# Patient Record
Sex: MALE | Race: WHITE | Employment: OTHER | URBAN - NONMETROPOLITAN AREA
[De-identification: names, ages, dates, MRNs, and addresses within clinical notes are randomized per-mention and may not be internally consistent; named-entity substitution may affect disease eponyms.]

---

## 2019-05-07 ENCOUNTER — HOSPITAL ENCOUNTER (OUTPATIENT)
Dept: NON INVASIVE DIAGNOSTICS | Age: 84
Discharge: HOME | End: 2019-05-07
Payer: MEDICARE

## 2019-05-07 ENCOUNTER — HOSPITAL ENCOUNTER (OUTPATIENT)
Dept: INTERVENTIONAL RADIOLOGY/VASCULAR | Age: 84
Discharge: HOME | End: 2019-05-07
Payer: MEDICARE

## 2019-05-07 DIAGNOSIS — Z13.6 ENCOUNTER FOR SCREENING FOR VASCULAR DISEASE: ICD-10-CM

## 2019-05-07 LAB
LV EF: 33 %
LVEF MODALITY: NORMAL

## 2019-05-07 PROCEDURE — 9900000021 US VASCULAR SCREENING

## 2019-05-07 PROCEDURE — 93306 TTE W/DOPPLER COMPLETE: CPT

## 2019-05-14 ENCOUNTER — TELEPHONE (OUTPATIENT)
Dept: CARDIOLOGY CLINIC | Age: 84
End: 2019-05-14

## 2019-05-15 ENCOUNTER — OFFICE VISIT (OUTPATIENT)
Dept: CARDIOTHORACIC SURGERY | Age: 84
End: 2019-05-15
Payer: MEDICARE

## 2019-05-15 ENCOUNTER — OFFICE VISIT (OUTPATIENT)
Dept: CARDIOLOGY CLINIC | Age: 84
End: 2019-05-15
Payer: MEDICARE

## 2019-05-15 VITALS
DIASTOLIC BLOOD PRESSURE: 66 MMHG | HEIGHT: 68 IN | WEIGHT: 192.6 LBS | BODY MASS INDEX: 29.19 KG/M2 | HEART RATE: 58 BPM | SYSTOLIC BLOOD PRESSURE: 114 MMHG

## 2019-05-15 VITALS — DIASTOLIC BLOOD PRESSURE: 84 MMHG | WEIGHT: 192 LBS | HEART RATE: 62 BPM | SYSTOLIC BLOOD PRESSURE: 120 MMHG

## 2019-05-15 DIAGNOSIS — R06.02 SHORTNESS OF BREATH: Primary | ICD-10-CM

## 2019-05-15 DIAGNOSIS — I35.0 SEVERE AORTIC STENOSIS: ICD-10-CM

## 2019-05-15 PROCEDURE — 1123F ACP DISCUSS/DSCN MKR DOCD: CPT | Performed by: PHYSICIAN ASSISTANT

## 2019-05-15 PROCEDURE — 99215 OFFICE O/P EST HI 40 MIN: CPT | Performed by: THORACIC SURGERY (CARDIOTHORACIC VASCULAR SURGERY)

## 2019-05-15 PROCEDURE — APPSS60 APP SPLIT SHARED TIME 46-60 MINUTES: Performed by: PHYSICIAN ASSISTANT

## 2019-05-15 PROCEDURE — G8427 DOCREV CUR MEDS BY ELIG CLIN: HCPCS | Performed by: INTERNAL MEDICINE

## 2019-05-15 PROCEDURE — 1036F TOBACCO NON-USER: CPT | Performed by: INTERNAL MEDICINE

## 2019-05-15 PROCEDURE — G8419 CALC BMI OUT NRM PARAM NOF/U: HCPCS | Performed by: PHYSICIAN ASSISTANT

## 2019-05-15 PROCEDURE — 1123F ACP DISCUSS/DSCN MKR DOCD: CPT | Performed by: INTERNAL MEDICINE

## 2019-05-15 PROCEDURE — G8427 DOCREV CUR MEDS BY ELIG CLIN: HCPCS | Performed by: PHYSICIAN ASSISTANT

## 2019-05-15 PROCEDURE — 93000 ELECTROCARDIOGRAM COMPLETE: CPT | Performed by: INTERNAL MEDICINE

## 2019-05-15 PROCEDURE — 4040F PNEUMOC VAC/ADMIN/RCVD: CPT | Performed by: PHYSICIAN ASSISTANT

## 2019-05-15 PROCEDURE — 99205 OFFICE O/P NEW HI 60 MIN: CPT | Performed by: INTERNAL MEDICINE

## 2019-05-15 PROCEDURE — 4040F PNEUMOC VAC/ADMIN/RCVD: CPT | Performed by: INTERNAL MEDICINE

## 2019-05-15 PROCEDURE — 99204 OFFICE O/P NEW MOD 45 MIN: CPT | Performed by: PHYSICIAN ASSISTANT

## 2019-05-15 PROCEDURE — 1036F TOBACCO NON-USER: CPT | Performed by: PHYSICIAN ASSISTANT

## 2019-05-15 PROCEDURE — G8419 CALC BMI OUT NRM PARAM NOF/U: HCPCS | Performed by: INTERNAL MEDICINE

## 2019-05-15 RX ORDER — ATENOLOL 50 MG/1
50 TABLET ORAL DAILY
Status: ON HOLD | COMMUNITY
End: 2019-07-19 | Stop reason: HOSPADM

## 2019-05-15 RX ORDER — DILTIAZEM HYDROCHLORIDE 240 MG/1
240 CAPSULE, COATED, EXTENDED RELEASE ORAL DAILY
COMMUNITY

## 2019-05-15 RX ORDER — LOVASTATIN 40 MG/1
40 TABLET ORAL NIGHTLY
Status: ON HOLD | COMMUNITY
End: 2019-07-19 | Stop reason: HOSPADM

## 2019-05-15 RX ORDER — CLOPIDOGREL BISULFATE 75 MG/1
75 TABLET ORAL DAILY
COMMUNITY

## 2019-05-15 RX ORDER — GLIMEPIRIDE 4 MG/1
4 TABLET ORAL
Status: ON HOLD | COMMUNITY
End: 2019-07-19 | Stop reason: HOSPADM

## 2019-05-15 RX ORDER — PANTOPRAZOLE SODIUM 40 MG/1
40 GRANULE, DELAYED RELEASE ORAL
COMMUNITY

## 2019-05-15 RX ORDER — FOSINOPRIL SODIUM 20 MG/1
20 TABLET ORAL DAILY
Status: ON HOLD | COMMUNITY
End: 2019-07-19 | Stop reason: HOSPADM

## 2019-05-15 NOTE — PROGRESS NOTES
[facility] CARDIOLOGY  [address] [address]  Dept: [phone]  Dept Fax: [phone]  Loc: [phone]    Visit Date: 5/15/2019    Mr. Ángel Álvarez is a 82 y.o. male  who presented for:  Chief Complaint   Patient presents with    New Patient    Check-Up       HPI:   HPI   83 yo M with hx of PAD, right CEA, 20 year smoking history who presents for evaluation of severe aortic stenosis. He has had a progressive decline over the last 6 months. He was told he had severe AS for over 5 years but thought he did not need to do anything about it. He recently had echo showing EF 30-35%, MICHELLE 0.3 cm2, mean gradient 62. He is tired and cannot do his ADLS without having to stop and \"take a breather. \"  He notes some mild swelling of his LE. He does have lightheadedness but ignores it on a daily basis. No syncope. No hx of MI. No active malignancies. He has poor dentition and states he needs to see a dentist.  ECG shows NSR, TWI anterolaterally/inferolaterally.           Current Outpatient Medications:     atenolol (TENORMIN) 50 MG tablet, Take 50 mg by mouth daily, Disp: , Rfl:     fosinopril (MONOPRIL) 20 MG tablet, Take 20 mg by mouth daily, Disp: , Rfl:     glimepiride (AMARYL) 4 MG tablet, Take 4 mg by mouth every morning (before breakfast), Disp: , Rfl:     diltiazem (CARDIZEM CD) 240 MG extended release capsule, Take 240 mg by mouth daily, Disp: , Rfl:     metFORMIN (GLUCOPHAGE) 500 MG tablet, Take 500 mg by mouth 4 times daily, Disp: , Rfl:     lovastatin (MEVACOR) 40 MG tablet, Take 40 mg by mouth nightly, Disp: , Rfl:     clopidogrel (PLAVIX) 75 MG tablet, Take 75 mg by mouth daily, Disp: , Rfl:     pantoprazole sodium (PROTONIX) 40 MG PACK packet, Take 40 mg by mouth every morning (before breakfast), Disp: , Rfl:     Past Medical History  Ken Salcedo  has a past medical history of Abnormality of aortic valve, Cancer ([facility] [address] and Carotid artery narrowing. Social History  Polly Samuel  reports that he quit smoking about 50 years ago. He started smoking about 70 years ago. He has never used smokeless tobacco. He reports that he drank alcohol. He reports that he does not use drugs. Family History  Polly Samuel family history includes Diabetes in his mother; Heart Attack in his brother; Hypertension in his mother. There is no family history of bicuspid aortic valve, aneurysms, heart transplant, pacemakers, defibrillators, or sudden cardiac death. Past Surgical History   History reviewed. No pertinent surgical history. Review of Systems   Constitutional: Negative for chills and fever  HENT: Negative for congestion, sinus pressure, sneezing and sore throat. Eyes: Negative for pain, discharge, redness and itching. Respiratory: Negative for apnea, cough  Gastrointestinal: Negative for blood in stool, constipation, diarrhea   Endocrine: Negative for cold intolerance, heat intolerance, polydipsia. Genitourinary: Negative for dysuria, enuresis, flank pain and hematuria. Musculoskeletal: Negative for arthralgias, joint swelling and neck pain. Neurological: Negative for numbness and headaches. Psychiatric/Behavioral: Negative for agitation, confusion, decreased concentration and dysphoric mood. Objective:     /84   Pulse 62   Wt 192 lb (87.1 kg)     Wt Readings from Last 3 Encounters:   05/15/19 192 lb (87.1 kg)     BP Readings from Last 3 Encounters:   05/15/19 120/84       Nursing note and vitals reviewed. Physical Exam   Constitutional: Oriented to person, place, and time. Appears well-developed but frail. HENT:   Head: Normocephalic and atraumatic. Eyes: EOM are normal. Pupils are equal, round, and reactive to light. Neck: Normal range of motion. Neck supple. No JVD present. Oral:  Missing teeth, poor dentition  Cardiovascular: Normal rate, regular rhythm, normal heart sounds and intact distal pulses.     Soft, but high pitched, late peaking murmur, soft radiation to both carotids, S2 not audible  Pulmonary/Chest: Effort normal and breath sounds normal. No respiratory distress. No wheezes. No rales. Abdominal: Soft. Bowel sounds are normal. No distension. There is no tenderness. Musculoskeletal: Normal range of motion. 1+ bilateral LE edema. Neurological: Alert and oriented to person, place, and time. No cranial nerve deficit. Coordination normal.   Skin: Skin is warm and dry. Psychiatric: Normal mood and affect. No results found for: CKTOTAL, CKMB, CKMBINDEX    No results found for: WBC, RBC, HGB, HCT, MCV, MCH, MCHC, RDW, PLT, MPV    No results found for: NA, K, CL, CO2, BUN, LABALBU, CREATININE, CALCIUM, GFRAA, LABGLOM, GLUCOSE    No results found for: ALKPHOS, ALT, AST, PROT, BILITOT, BILIDIR, IBILI, LABALBU    No results found for: MG    No results found for: INR, PROTIME      No results found for: LABA1C    No results found for: TRIG, HDL, LDLCALC, LDLDIRECT, LABVLDL    No results found for: TSH      Testing Reviewed:      I have individually reviewed the cardiac test below:    ECHO:   Results for orders placed during the hospital encounter of 05/07/19   Echocardiogram 2D/ M-Mode/ Colorflow/ Do    Narrative Transthoracic Echocardiography Report (TTE)     Demographics      Patient Name     Suri Garcia Gender                 Male      MR #             [id number]    Race                                                       Ethnicity      Account #        [de-identified]    Room Number      Accession Number [id number]    Date of Study          05/07/2019      Date of Birth    1935   Referring Physician    Stephania Kurtz MD      Age              82 year(s)   Sebastien Kemp [facility]                                    Interpreting Physician Caswell Cooks MD     Procedure    Type of Study      TTE procedure:ECHOCARDIOGRAM COMPLETE 2D W DOPPLER W COLOR.      Procedure Date  Date: 05/07/2019 Start: 03:20 PM    Study Location: Echo Lab  Technical Quality: Adequate visualization    Indications: Aortic stenosis. Additional Medical History:Hypertension, PVD, Former smoker, Family history  of heart disease    Patient Status: Routine    Height: 68 inches Weight: 197 pounds BSA: 2.03 m^2 BMI: 29.95 kg/m^2    BP: 106/58 mmHg     Conclusions      Summary   Ejection fraction was estimated at 30-35%   Left atrial size was severely dilated   The right ventricular size was normal with normal systolic function and   wall thickness. The aortic valve was heavily calcified, restricted, and demonstrated   reduced mobility. Transaortic velocity was 4.8 m/s, mean gradient 62 mmHg, estimated MICHELLE 0.3   cm2. There is critical aortic stenosis. There was trace evidence of aortic regurgitation. There was mild mitral regurgitation. There was trace tricuspid regurgitation. Assuming RAP = 5 mmHg, the estimated RVSP = 38 mmHg. IVC size is within normal limits with normal respiratory phasic changes. If clinically indicated, consider referral to the [address] [place] [address]   ([phone]). Signature      ----------------------------------------------------------------   Electronically signed by Tyshawn Richardson MD (Interpreting   physician) on 05/07/2019 at 04:56 PM   ----------------------------------------------------------------      Findings      Mitral Valve   The mitral valve structure demonstrated posterior leaflet calcification. DOPPLER: The transmitral velocity was within the normal range with no   evidence for mitral stenosis. There was mild mitral regurgitation. Aortic Valve   The aortic valve was heavily calcified, restricted, and demonstrated   reduced mobility. DOPPLER: Transaortic velocity was 4.8 m/s, mean gradient   62 mmHg, estimated MICHELLE 0.3 cm2. There is critical aortic stenosis. There   was trace evidence of aortic regurgitation.       Tricuspid Valve   The tricuspid valve structure was normal with normal leaflet separation. DOPPLER: There was no evidence of tricuspid stenosis. There was trace   tricuspid regurgitation. Assuming RAP = 5 mmHg, the estimated RVSP = 38   mmHg. Pulmonic Valve   The pulmonic valve leaflets were not well seen. DOPPLER: The transpulmonic   velocity was within the normal range with trace regurgitation. Left Atrium   Left atrial size was severely dilated. Left Ventricle   Normal left ventricular size and severely reduced systolic function. There was severe global hypokinesis. Wall thickness was within normal limits. Ejection fraction was estimated at 30-35%      Right Atrium   Right atrial size was normal.      Right Ventricle   The right ventricular size was normal with normal systolic function and   wall thickness. Pericardial Effusion   The pericardium was normal in appearance with no evidence of a pericardial   effusion. Pleural Effusion   No evidence of pleural effusion. Aorta / Great Vessels   IVC size is within normal limits with normal respiratory phasic changes.      M-Mode/2D Measurements & Calculations      LV Diastolic   LV Systolic Dimension:    AV Cusp Separation: 1.1 cmLA   Dimension: 5.3 4.6 cm                    Dimension: 3.9 cmAO Root   cm             LV Volume Diastolic: 471  Dimension: 3.1 cmLA Area: 25.2   LV FS:13.2 %   ml                        cm^2   LV PW          LV Volume Systolic: 43.4   Diastolic: 0.9 ml   cm             LV EDV/LV EDV Index: 150   Septum         ml/74 m^2LV ESV/LV ESV    RV Diastolic Dimension: 3 cm   Diastolic: 0.9 Index: 15.2 ml/48 m^2   cm             EF Calculated: 35 %       LA/Aorta: 1.26                     LV Length: 9.1 cm         LA volume/Index: 86 ml /42m^2      LV Area        LVOT: 1.8 cm   Diastolic:   14.2 cm^2   LV Area   Systolic: 10.8   cm^2     Doppler Measurements & Calculations      MV Peak E-Wave:    AV Peak Velocity: 453    LVOT Peak Velocity: 66.2 cm/s   104 cm/s myself, family, patient, and structural heart coordinators. The family and patient were explained the risks/benefits/alternatives of the procedure, how the procedure works, the work-up, post-procedural care, and all of the possible complications of the procedure, including, but not limited to vascular injury, debilitating stroke, need for permanent pacemaker, and death. The patient/family would like to pursue TAVR at our facility and we will work towards this goal.         The patient and family understand that should there be any findings or issues that arise during the evaluation that would preclude TAVR, that this will need to be evaluated prior to proceeding with a percutaneous approach. Further, a unified heart team approach will be performed prior to proceeding with TAVR which includes the patient's primary care givers, cardiologist, and the entire structural heart team (interventionalist, CT surgeons, structural heart NP).    The patient and family appeared to understand everything, all of their questions were answered to their satisfaction and they are agreeable to proceed with further evaluation for TAVR.       Thank you for allowing us to participate in the care of this patient.   Please do not hesitate to call us with questions.         Disposition:  TAVR work-up    Electronically signed by Alicia Chen MD   5/15/2019 at 12:45 PM

## 2019-05-15 NOTE — PROGRESS NOTES
CT surgery New Patient Office Appointment    5/15/2019 2:15 PM    Patient's Name/Date of Birth: Qiana Middleton / 1935 (82 y.o.)    PCP: Gilma Santiago MD    Date: May 15, 2019     HPI  We had the pleasure of seeing Qiana Middleton in the office today, as you know this is a very pleasant 82y.o. year old male with a history of severe aortic stenosis. He smoked 20 yrs and quit around age 30. He has hx of Rt Fem-Pop utilizing left greater saphenous vein in 2010 and Rt CEA in 2005. He has noticed REESE and chest pressure with one flight of stairs only after eating a large meal for the past few weeks. He denies any hx of MI or CHF. He denies any hx of syncope. He has hx of heart murmur for many years. He underwent an Echo with the results listed below. He states that he was told he needs his aortic valve fixed and after seeing what his brother went thru with open heart surgery, he refuses to have his chest \"cracked open. \"    Echo 5/7/19  Summary   Ejection fraction was estimated at 30-35%   Left atrial size was severely dilated   The right ventricular size was normal with normal systolic function and   wall thickness.   The aortic valve was heavily calcified, restricted, and demonstrated   reduced mobility.   Transaortic velocity was 4.8 m/s, mean gradient 62 mmHg, estimated MICHELLE 0.3   cm2. There is critical aortic stenosis.  Serenity Factor was trace evidence of aortic regurgitation.   There was mild mitral regurgitation.   There was trace tricuspid regurgitation.   Assuming RAP = 5 mmHg, the estimated RVSP = 38 mmHg.   IVC size is within normal limits with normal respiratory phasic changes. PastMedical History:  Tad Dougherty  has a past medical history of Abnormality of aortic valve, Cancer ([address] and Carotid artery narrowing. Past Surgical History:  2005 Rt CEA  2010 Rt Fem-Pop utilizing left greater saphenous vein     Allergies: The patient has No Known Allergies.     Medications:    Current Outpatient Medications:    atenolol (TENORMIN) 50 MG tablet, Take 50 mg by mouth daily, Disp: , Rfl:     fosinopril (MONOPRIL) 20 MG tablet, Take 20 mg by mouth daily, Disp: , Rfl:     glimepiride (AMARYL) 4 MG tablet, Take 4 mg by mouth every morning (before breakfast), Disp: , Rfl:     diltiazem (CARDIZEM CD) 240 MG extended release capsule, Take 240 mg by mouth daily, Disp: , Rfl:     metFORMIN (GLUCOPHAGE) 500 MG tablet, Take 500 mg by mouth 4 times daily, Disp: , Rfl:     lovastatin (MEVACOR) 40 MG tablet, Take 40 mg by mouth nightly, Disp: , Rfl:     clopidogrel (PLAVIX) 75 MG tablet, Take 75 mg by mouth daily, Disp: , Rfl:     pantoprazole sodium (PROTONIX) 40 MG PACK packet, Take 40 mg by mouth every morning (before breakfast), Disp: , Rfl:     Family History: This patient's family history includes Diabetes in his mother; Heart Attack in his brother; Hypertension in his mother. Social History:  Tessie Spencer  reports that he quit smoking about 50 years ago. He started smoking about 70 years ago. He has never used smokeless tobacco. He reports that he drank alcohol. He reports that he does not use drugs. Imaging:  ECHO: see HPI     Vitals:    05/15/19 1407   BP: 114/66   Site: Left Upper Arm   Position: Sitting   Cuff Size: Medium Adult   Pulse: 58   Weight: 192 lb 9.6 oz (87.4 kg)   Height: 5' 8\" (1.727 m)     ROS:   Constitutional: Negative for activity change, chills, fever and unexpected weight change. Positive for fatigue. Respiratory: Negative for apnea, shortness of breath, wheezing and stridor. Cardiovascular: Negative for chest pain, palpitations and leg swelling. Gastrointestinal: Negative for hematochezia, melana, constipation, and N/V/D. Musculoskeletal: Negative for myalgias  Skin: Negative for color change, rash and wound. Neurological: Negative for dizziness or syncope.     Physical Exam:   General Appearance: alert ,conversing, in no acute distress  Cardiovascular: normal rate, regular rhythm, Grade III/VI ADAMS LSB with no rubs, clicks, or gallops  Pulmonary/Chest: clear to auscultation bilaterally- no wheezes, rales or rhonchi, normal air movement, no respiratory distress  Abdomen:soft, non-tender, non-distended, normal bowel sounds, no bruits,   Extremities: no cyanosis or clubbing. Left leg swelling mild (present since his bypass in 2010). Skin: warm and dry, no rash or erythema  Head: normocephalic and atraumatic  Neck: supple and non-tender without mass, no thyromegaly, no JVD   Musculoskeletal: normal range of motion, no joint swelling, deformity or tenderness. Neurological: alert, oriented, normal speech, no focal findings or movement disorder noted. Capillary Refill: Brisk,< 3 seconds   Peripheral Pulses: +1 palpable, equal bilaterally     Assessment:   Severe Aortic Stenosis    Plan: 5/15/19  3 82year-old male with severe symptomatic aortic stenosis. Patient clearly is at high risk for surgical aortic valve replacement, for reasons of refusal of open heart surgery, age, frailty, and significant PVD. Recommend proceed with evaluation for TAVR. The plan of care was discussed in detail with Dr. Dena Zhu.

## 2019-05-20 ENCOUNTER — TELEPHONE (OUTPATIENT)
Dept: CARDIOLOGY CLINIC | Age: 84
End: 2019-05-20

## 2019-07-08 ENCOUNTER — TELEPHONE (OUTPATIENT)
Dept: CARDIOLOGY CLINIC | Age: 84
End: 2019-07-08

## 2019-07-08 ENCOUNTER — HOSPITAL ENCOUNTER (OUTPATIENT)
Dept: GENERAL RADIOLOGY | Age: 84
Discharge: HOME | DRG: 291 | End: 2019-07-08
Payer: MEDICARE

## 2019-07-08 ENCOUNTER — HOSPITAL ENCOUNTER (OUTPATIENT)
Age: 84
Discharge: HOME | DRG: 291 | End: 2019-07-08
Payer: MEDICARE

## 2019-07-08 DIAGNOSIS — J18.9 PNEUMONIA DUE TO INFECTIOUS ORGANISM, UNSPECIFIED LATERALITY, UNSPECIFIED PART OF LUNG: ICD-10-CM

## 2019-07-08 DIAGNOSIS — R05.9 COUGH: ICD-10-CM

## 2019-07-08 PROCEDURE — 71046 X-RAY EXAM CHEST 2 VIEWS: CPT

## 2019-07-08 NOTE — TELEPHONE ENCOUNTER
Denys Villavicencio from Dr. Rip Rosas office called to notify us that pt has been non-compliant and knows that Dr. Maddie Dumas and Dr. Natalee Huerta have been in contact in regards to pt in the past.     Denys Villavicnecio states pt is much worse now. They would like a nurse or Dr. Maddie Dumas to call their office back at [phone]    Could not reach office.

## 2019-07-09 NOTE — TELEPHONE ENCOUNTER
Talked to staff at Dr. Danny Barreto office and patient is more symptomatic with SOB and dizziness and she states patient wants to move forward with dental work. Appt made for patient to see Dr. Jones Tee on 7/10/2019 at 1030. She stated she will contact patient.

## 2019-07-10 ENCOUNTER — APPOINTMENT (OUTPATIENT)
Dept: GENERAL RADIOLOGY | Age: 84
DRG: 291 | End: 2019-07-10
Payer: MEDICARE

## 2019-07-10 ENCOUNTER — APPOINTMENT (OUTPATIENT)
Dept: CT IMAGING | Age: 84
DRG: 291 | End: 2019-07-10
Payer: MEDICARE

## 2019-07-10 ENCOUNTER — HOSPITAL ENCOUNTER (INPATIENT)
Age: 84
LOS: 9 days | Discharge: HOME | DRG: 291 | End: 2019-07-19
Attending: NURSE PRACTITIONER | Admitting: INTERNAL MEDICINE
Payer: MEDICARE

## 2019-07-10 DIAGNOSIS — N17.9 AKI (ACUTE KIDNEY INJURY) (HCC): Primary | ICD-10-CM

## 2019-07-10 DIAGNOSIS — A41.9 SEPTICEMIA (HCC): ICD-10-CM

## 2019-07-10 DIAGNOSIS — E86.0 DEHYDRATION: ICD-10-CM

## 2019-07-10 DIAGNOSIS — J18.9 PNEUMONIA DUE TO ORGANISM: ICD-10-CM

## 2019-07-10 LAB
ALBUMIN SERPL-MCNC: 3.3 G/DL (ref 3.5–5.1)
ALP BLD-CCNC: 95 U/L (ref 38–126)
ALT SERPL-CCNC: 102 U/L (ref 11–66)
ANION GAP SERPL CALCULATED.3IONS-SCNC: 20 MEQ/L (ref 8–16)
ANION GAP SERPL CALCULATED.3IONS-SCNC: 23 MEQ/L (ref 8–16)
AST SERPL-CCNC: 111 U/L (ref 5–40)
BACTERIA: ABNORMAL /HPF
BASOPHILS # BLD: 0.2 %
BASOPHILS ABSOLUTE: 0 THOU/MM3 (ref 0–0.1)
BILIRUB SERPL-MCNC: 1.1 MG/DL (ref 0.3–1.2)
BILIRUBIN DIRECT: 0.4 MG/DL (ref 0–0.3)
BILIRUBIN URINE: ABNORMAL
BLOOD, URINE: ABNORMAL
BUN BLDV-MCNC: 37 MG/DL (ref 7–22)
BUN BLDV-MCNC: 39 MG/DL (ref 7–22)
CALCIUM SERPL-MCNC: 8.8 MG/DL (ref 8.5–10.5)
CALCIUM SERPL-MCNC: 9.1 MG/DL (ref 8.5–10.5)
CASTS 2: ABNORMAL /LPF
CASTS UA: ABNORMAL /LPF
CHARACTER, URINE: ABNORMAL
CHLORIDE BLD-SCNC: 102 MEQ/L (ref 98–111)
CHLORIDE BLD-SCNC: 99 MEQ/L (ref 98–111)
CO2: 14 MEQ/L (ref 23–33)
CO2: 16 MEQ/L (ref 23–33)
COLOR: ABNORMAL
CREAT SERPL-MCNC: 1.6 MG/DL (ref 0.4–1.2)
CREAT SERPL-MCNC: 1.7 MG/DL (ref 0.4–1.2)
CRYSTALS, UA: ABNORMAL
EKG ATRIAL RATE: 116 BPM
EKG P AXIS: 47 DEGREES
EKG P-R INTERVAL: 160 MS
EKG Q-T INTERVAL: 346 MS
EKG QRS DURATION: 100 MS
EKG QTC CALCULATION (BAZETT): 480 MS
EKG R AXIS: 38 DEGREES
EKG T AXIS: -167 DEGREES
EKG VENTRICULAR RATE: 116 BPM
EOSINOPHIL # BLD: 0.1 %
EOSINOPHILS ABSOLUTE: 0 THOU/MM3 (ref 0–0.4)
EPITHELIAL CELLS, UA: ABNORMAL /HPF
ERYTHROCYTE [DISTWIDTH] IN BLOOD BY AUTOMATED COUNT: 15.9 % (ref 11.5–14.5)
ERYTHROCYTE [DISTWIDTH] IN BLOOD BY AUTOMATED COUNT: 55.1 FL (ref 35–45)
GFR SERPL CREATININE-BSD FRML MDRD: 39 ML/MIN/1.73M2
GFR SERPL CREATININE-BSD FRML MDRD: 42 ML/MIN/1.73M2
GLUCOSE BLD-MCNC: 189 MG/DL (ref 70–108)
GLUCOSE BLD-MCNC: 210 MG/DL (ref 70–108)
GLUCOSE BLD-MCNC: 210 MG/DL (ref 70–108)
GLUCOSE BLD-MCNC: 243 MG/DL (ref 70–108)
GLUCOSE URINE: NEGATIVE MG/DL
HCT VFR BLD CALC: 33 % (ref 42–52)
HEMOGLOBIN: 10.8 GM/DL (ref 14–18)
ICTOTEST: NEGATIVE
IMMATURE GRANS (ABS): 0.1 THOU/MM3 (ref 0–0.07)
IMMATURE GRANULOCYTES: 0.6 %
KETONES, URINE: ABNORMAL
LACTIC ACID, SEPSIS: 4.7 MMOL/L (ref 0.5–1.9)
LACTIC ACID, SEPSIS: 5 MMOL/L (ref 0.5–1.9)
LACTIC ACID: 2.5 MMOL/L (ref 0.5–2.2)
LACTIC ACID: 4.6 MMOL/L (ref 0.5–2.2)
LEUKOCYTE ESTERASE, URINE: NEGATIVE
LIPASE: 33.9 U/L (ref 5.6–51.3)
LYMPHOCYTES # BLD: 10.2 %
LYMPHOCYTES ABSOLUTE: 1.8 THOU/MM3 (ref 1–4.8)
MAGNESIUM: 2.2 MG/DL (ref 1.6–2.4)
MCH RBC QN AUTO: 33.3 PG (ref 26–33)
MCHC RBC AUTO-ENTMCNC: 32.7 GM/DL (ref 32.2–35.5)
MCV RBC AUTO: 101.9 FL (ref 80–94)
MISCELLANEOUS 2: ABNORMAL
MONOCYTES # BLD: 7.8 %
MONOCYTES ABSOLUTE: 1.4 THOU/MM3 (ref 0.4–1.3)
MRSA SCREEN RT-PCR: NEGATIVE
NITRITE, URINE: NEGATIVE
NUCLEATED RED BLOOD CELLS: 0 /100 WBC
OSMOLALITY CALCULATION: 285.7 MOSMOL/KG (ref 275–300)
PH UA: 5 (ref 5–9)
PLATELET # BLD: 458 THOU/MM3 (ref 130–400)
PMV BLD AUTO: 12.1 FL (ref 9.4–12.4)
POTASSIUM SERPL-SCNC: 5.3 MEQ/L (ref 3.5–5.2)
POTASSIUM SERPL-SCNC: 5.7 MEQ/L (ref 3.5–5.2)
PRO-BNP: ABNORMAL PG/ML (ref 0–1800)
PROCALCITONIN: 0.09 NG/ML (ref 0.01–0.09)
PROTEIN UA: 30
RBC # BLD: 3.24 MILL/MM3 (ref 4.7–6.1)
RBC URINE: ABNORMAL /HPF
REASON FOR REJECTION: NORMAL
REJECTED TEST: NORMAL
RENAL EPITHELIAL, UA: ABNORMAL
SEG NEUTROPHILS: 81.1 %
SEGMENTED NEUTROPHILS ABSOLUTE COUNT: 14.4 THOU/MM3 (ref 1.8–7.7)
SODIUM BLD-SCNC: 136 MEQ/L (ref 135–145)
SODIUM BLD-SCNC: 138 MEQ/L (ref 135–145)
SPECIFIC GRAVITY, URINE: > 1.03 (ref 1–1.03)
TOTAL PROTEIN: 7.4 G/DL (ref 6.1–8)
TROPONIN T: 0.06 NG/ML
TROPONIN T: 0.06 NG/ML
TROPONIN T: 0.07 NG/ML
UROBILINOGEN, URINE: 1 EU/DL (ref 0–1)
VANCOMYCIN RESISTANT ENTEROCOCCUS: NEGATIVE
WBC # BLD: 17.8 THOU/MM3 (ref 4.8–10.8)
WBC UA: ABNORMAL /HPF
YEAST: ABNORMAL

## 2019-07-10 PROCEDURE — 6360000002 HC RX W HCPCS

## 2019-07-10 PROCEDURE — 84145 PROCALCITONIN (PCT): CPT

## 2019-07-10 PROCEDURE — 71045 X-RAY EXAM CHEST 1 VIEW: CPT

## 2019-07-10 PROCEDURE — 36415 COLL VENOUS BLD VENIPUNCTURE: CPT

## 2019-07-10 PROCEDURE — 6360000002 HC RX W HCPCS: Performed by: STUDENT IN AN ORGANIZED HEALTH CARE EDUCATION/TRAINING PROGRAM

## 2019-07-10 PROCEDURE — 6370000000 HC RX 637 (ALT 250 FOR IP): Performed by: NURSE PRACTITIONER

## 2019-07-10 PROCEDURE — 2580000003 HC RX 258: Performed by: NURSE PRACTITIONER

## 2019-07-10 PROCEDURE — 99223 1ST HOSP IP/OBS HIGH 75: CPT | Performed by: NURSE PRACTITIONER

## 2019-07-10 PROCEDURE — 80048 BASIC METABOLIC PNL TOTAL CA: CPT

## 2019-07-10 PROCEDURE — 2060000000 HC ICU INTERMEDIATE R&B

## 2019-07-10 PROCEDURE — 96376 TX/PRO/DX INJ SAME DRUG ADON: CPT

## 2019-07-10 PROCEDURE — 81001 URINALYSIS AUTO W/SCOPE: CPT

## 2019-07-10 PROCEDURE — 83690 ASSAY OF LIPASE: CPT

## 2019-07-10 PROCEDURE — 83605 ASSAY OF LACTIC ACID: CPT

## 2019-07-10 PROCEDURE — 87641 MR-STAPH DNA AMP PROBE: CPT

## 2019-07-10 PROCEDURE — 85025 COMPLETE CBC W/AUTO DIFF WBC: CPT

## 2019-07-10 PROCEDURE — 82948 REAGENT STRIP/BLOOD GLUCOSE: CPT

## 2019-07-10 PROCEDURE — 87086 URINE CULTURE/COLONY COUNT: CPT

## 2019-07-10 PROCEDURE — 99223 1ST HOSP IP/OBS HIGH 75: CPT | Performed by: NUCLEAR MEDICINE

## 2019-07-10 PROCEDURE — 84484 ASSAY OF TROPONIN QUANT: CPT

## 2019-07-10 PROCEDURE — 83735 ASSAY OF MAGNESIUM: CPT

## 2019-07-10 PROCEDURE — 96367 TX/PROPH/DG ADDL SEQ IV INF: CPT

## 2019-07-10 PROCEDURE — 2709999900 HC NON-CHARGEABLE SUPPLY

## 2019-07-10 PROCEDURE — 87500 VANOMYCIN DNA AMP PROBE: CPT

## 2019-07-10 PROCEDURE — 96375 TX/PRO/DX INJ NEW DRUG ADDON: CPT

## 2019-07-10 PROCEDURE — 6360000002 HC RX W HCPCS: Performed by: NURSE PRACTITIONER

## 2019-07-10 PROCEDURE — 96365 THER/PROPH/DIAG IV INF INIT: CPT

## 2019-07-10 PROCEDURE — 6360000004 HC RX CONTRAST MEDICATION: Performed by: NURSE PRACTITIONER

## 2019-07-10 PROCEDURE — 87040 BLOOD CULTURE FOR BACTERIA: CPT

## 2019-07-10 PROCEDURE — 94640 AIRWAY INHALATION TREATMENT: CPT

## 2019-07-10 PROCEDURE — 80076 HEPATIC FUNCTION PANEL: CPT

## 2019-07-10 PROCEDURE — 99285 EMERGENCY DEPT VISIT HI MDM: CPT

## 2019-07-10 PROCEDURE — 94760 N-INVAS EAR/PLS OXIMETRY 1: CPT

## 2019-07-10 PROCEDURE — 74177 CT ABD & PELVIS W/CONTRAST: CPT

## 2019-07-10 PROCEDURE — 93005 ELECTROCARDIOGRAM TRACING: CPT | Performed by: NURSE PRACTITIONER

## 2019-07-10 PROCEDURE — 87081 CULTURE SCREEN ONLY: CPT

## 2019-07-10 PROCEDURE — 83880 ASSAY OF NATRIURETIC PEPTIDE: CPT

## 2019-07-10 PROCEDURE — 93010 ELECTROCARDIOGRAM REPORT: CPT | Performed by: INTERNAL MEDICINE

## 2019-07-10 RX ORDER — CLOPIDOGREL BISULFATE 75 MG/1
75 TABLET ORAL DAILY
Status: DISCONTINUED | OUTPATIENT
Start: 2019-07-10 | End: 2019-07-19 | Stop reason: HOSPADM

## 2019-07-10 RX ORDER — ALBUTEROL SULFATE 2.5 MG/3ML
5 SOLUTION RESPIRATORY (INHALATION) ONCE
Status: COMPLETED | OUTPATIENT
Start: 2019-07-10 | End: 2019-07-10

## 2019-07-10 RX ORDER — HEPARIN SODIUM 5000 [USP'U]/ML
5000 INJECTION, SOLUTION INTRAVENOUS; SUBCUTANEOUS EVERY 8 HOURS SCHEDULED
Status: DISCONTINUED | OUTPATIENT
Start: 2019-07-10 | End: 2019-07-19 | Stop reason: HOSPADM

## 2019-07-10 RX ORDER — SIMVASTATIN 20 MG
20 TABLET ORAL NIGHTLY
Status: DISCONTINUED | OUTPATIENT
Start: 2019-07-10 | End: 2019-07-18

## 2019-07-10 RX ORDER — SODIUM CHLORIDE 0.9 % (FLUSH) 0.9 %
10 SYRINGE (ML) INJECTION EVERY 12 HOURS SCHEDULED
Status: DISCONTINUED | OUTPATIENT
Start: 2019-07-10 | End: 2019-07-10 | Stop reason: SDUPTHER

## 2019-07-10 RX ORDER — SODIUM CHLORIDE 0.9 % (FLUSH) 0.9 %
10 SYRINGE (ML) INJECTION EVERY 12 HOURS SCHEDULED
Status: DISCONTINUED | OUTPATIENT
Start: 2019-07-10 | End: 2019-07-19 | Stop reason: HOSPADM

## 2019-07-10 RX ORDER — 0.9 % SODIUM CHLORIDE 0.9 %
30 INTRAVENOUS SOLUTION INTRAVENOUS ONCE
Status: COMPLETED | OUTPATIENT
Start: 2019-07-10 | End: 2019-07-10

## 2019-07-10 RX ORDER — SODIUM CHLORIDE 0.9 % (FLUSH) 0.9 %
10 SYRINGE (ML) INJECTION PRN
Status: DISCONTINUED | OUTPATIENT
Start: 2019-07-10 | End: 2019-07-10 | Stop reason: SDUPTHER

## 2019-07-10 RX ORDER — ONDANSETRON 2 MG/ML
4 INJECTION INTRAMUSCULAR; INTRAVENOUS ONCE
Status: COMPLETED | OUTPATIENT
Start: 2019-07-10 | End: 2019-07-10

## 2019-07-10 RX ORDER — FUROSEMIDE 10 MG/ML
20 INJECTION INTRAMUSCULAR; INTRAVENOUS ONCE
Status: COMPLETED | OUTPATIENT
Start: 2019-07-10 | End: 2019-07-10

## 2019-07-10 RX ORDER — CALCIUM GLUCONATE 94 MG/ML
1 INJECTION, SOLUTION INTRAVENOUS ONCE
Status: DISCONTINUED | OUTPATIENT
Start: 2019-07-10 | End: 2019-07-10

## 2019-07-10 RX ORDER — IPRATROPIUM BROMIDE AND ALBUTEROL SULFATE 2.5; .5 MG/3ML; MG/3ML
1 SOLUTION RESPIRATORY (INHALATION)
Status: DISCONTINUED | OUTPATIENT
Start: 2019-07-10 | End: 2019-07-19 | Stop reason: HOSPADM

## 2019-07-10 RX ORDER — LEVOFLOXACIN 5 MG/ML
750 INJECTION, SOLUTION INTRAVENOUS
Status: DISCONTINUED | OUTPATIENT
Start: 2019-07-12 | End: 2019-07-12

## 2019-07-10 RX ORDER — LEVOFLOXACIN 5 MG/ML
750 INJECTION, SOLUTION INTRAVENOUS ONCE
Status: COMPLETED | OUTPATIENT
Start: 2019-07-10 | End: 2019-07-10

## 2019-07-10 RX ORDER — ONDANSETRON 2 MG/ML
INJECTION INTRAMUSCULAR; INTRAVENOUS
Status: COMPLETED
Start: 2019-07-10 | End: 2019-07-10

## 2019-07-10 RX ORDER — DILTIAZEM HYDROCHLORIDE 240 MG/1
240 CAPSULE, COATED, EXTENDED RELEASE ORAL DAILY
Status: DISCONTINUED | OUTPATIENT
Start: 2019-07-10 | End: 2019-07-19 | Stop reason: HOSPADM

## 2019-07-10 RX ORDER — PANTOPRAZOLE SODIUM 40 MG/1
40 TABLET, DELAYED RELEASE ORAL
Status: DISCONTINUED | OUTPATIENT
Start: 2019-07-11 | End: 2019-07-15

## 2019-07-10 RX ORDER — LEVOFLOXACIN 5 MG/ML
750 INJECTION, SOLUTION INTRAVENOUS EVERY 24 HOURS
Status: DISCONTINUED | OUTPATIENT
Start: 2019-07-11 | End: 2019-07-10

## 2019-07-10 RX ORDER — ATENOLOL 50 MG/1
50 TABLET ORAL DAILY
Status: CANCELLED | OUTPATIENT
Start: 2019-07-10

## 2019-07-10 RX ORDER — SODIUM CHLORIDE 0.9 % (FLUSH) 0.9 %
10 SYRINGE (ML) INJECTION PRN
Status: DISCONTINUED | OUTPATIENT
Start: 2019-07-10 | End: 2019-07-19 | Stop reason: HOSPADM

## 2019-07-10 RX ORDER — SODIUM CHLORIDE 9 MG/ML
INJECTION, SOLUTION INTRAVENOUS CONTINUOUS
Status: DISCONTINUED | OUTPATIENT
Start: 2019-07-10 | End: 2019-07-12

## 2019-07-10 RX ORDER — ONDANSETRON 2 MG/ML
4 INJECTION INTRAMUSCULAR; INTRAVENOUS EVERY 6 HOURS PRN
Status: DISCONTINUED | OUTPATIENT
Start: 2019-07-10 | End: 2019-07-19 | Stop reason: HOSPADM

## 2019-07-10 RX ADMIN — CEFTRIAXONE SODIUM 1 G: 1 INJECTION, POWDER, FOR SOLUTION INTRAMUSCULAR; INTRAVENOUS at 06:07

## 2019-07-10 RX ADMIN — INSULIN LISPRO 1 UNITS: 100 INJECTION, SOLUTION INTRAVENOUS; SUBCUTANEOUS at 20:05

## 2019-07-10 RX ADMIN — FUROSEMIDE 20 MG: 10 INJECTION, SOLUTION INTRAMUSCULAR; INTRAVENOUS at 15:37

## 2019-07-10 RX ADMIN — ONDANSETRON 4 MG: 2 INJECTION INTRAMUSCULAR; INTRAVENOUS at 20:34

## 2019-07-10 RX ADMIN — ONDANSETRON 4 MG: 2 INJECTION INTRAMUSCULAR; INTRAVENOUS at 08:16

## 2019-07-10 RX ADMIN — SIMVASTATIN 20 MG: 20 TABLET, FILM COATED ORAL at 20:01

## 2019-07-10 RX ADMIN — ALBUTEROL SULFATE 5 MG: 2.5 SOLUTION RESPIRATORY (INHALATION) at 06:31

## 2019-07-10 RX ADMIN — DILTIAZEM HYDROCHLORIDE 240 MG: 240 CAPSULE, COATED, EXTENDED RELEASE ORAL at 15:38

## 2019-07-10 RX ADMIN — SODIUM CHLORIDE 1000 ML: 900 INJECTION, SOLUTION INTRAVENOUS at 05:40

## 2019-07-10 RX ADMIN — LEVOFLOXACIN 750 MG: 5 INJECTION, SOLUTION INTRAVENOUS at 06:50

## 2019-07-10 RX ADMIN — HEPARIN SODIUM 5000 UNITS: 5000 INJECTION INTRAVENOUS; SUBCUTANEOUS at 15:37

## 2019-07-10 RX ADMIN — IPRATROPIUM BROMIDE AND ALBUTEROL SULFATE 1 AMPULE: .5; 3 SOLUTION RESPIRATORY (INHALATION) at 16:15

## 2019-07-10 RX ADMIN — HEPARIN SODIUM 5000 UNITS: 5000 INJECTION INTRAVENOUS; SUBCUTANEOUS at 21:33

## 2019-07-10 RX ADMIN — IOPAMIDOL 80 ML: 755 INJECTION, SOLUTION INTRAVENOUS at 05:56

## 2019-07-10 RX ADMIN — SODIUM CHLORIDE 1000 ML: 900 INJECTION, SOLUTION INTRAVENOUS at 05:00

## 2019-07-10 RX ADMIN — SODIUM CHLORIDE: 9 INJECTION, SOLUTION INTRAVENOUS at 15:38

## 2019-07-10 RX ADMIN — INSULIN LISPRO 2 UNITS: 100 INJECTION, SOLUTION INTRAVENOUS; SUBCUTANEOUS at 17:02

## 2019-07-10 RX ADMIN — ONDANSETRON 4 MG: 2 INJECTION INTRAMUSCULAR; INTRAVENOUS at 06:23

## 2019-07-10 RX ADMIN — CLOPIDOGREL 75 MG: 75 TABLET, FILM COATED ORAL at 15:36

## 2019-07-10 RX ADMIN — IPRATROPIUM BROMIDE AND ALBUTEROL SULFATE 1 AMPULE: .5; 3 SOLUTION RESPIRATORY (INHALATION) at 20:07

## 2019-07-10 ASSESSMENT — ENCOUNTER SYMPTOMS
RHINORRHEA: 0
DIARRHEA: 0
COUGH: 0
NAUSEA: 0
CONSTIPATION: 1
BACK PAIN: 0
SORE THROAT: 0
EYE REDNESS: 0
VOMITING: 1
WHEEZING: 0
EYE DISCHARGE: 0
ABDOMINAL PAIN: 0
SHORTNESS OF BREATH: 1

## 2019-07-10 ASSESSMENT — PAIN SCALES - GENERAL
PAINLEVEL_OUTOF10: 0

## 2019-07-10 NOTE — PLAN OF CARE
Problem: Falls - Risk of:  Goal: Will remain free from falls  Description  Will remain free from falls  Outcome: Met This Shift  Note:   Up with one assist and walker. States he has been using walker at home     Problem: Risk for Impaired Skin Integrity  Goal: Tissue integrity - skin and mucous membranes  Description  Structural intactness and normal physiological function of skin and  mucous membranes. Outcome: Met This Shift  Note:   No new skin breakdown this shift  Turns himself frequently in bed     Problem: Discharge Planning:  Goal: Participates in care planning  Description  Participates in care planning  Outcome: Met This Shift     Problem: Airway Clearance - Ineffective:  Goal: Clear lung sounds  Description  Clear lung sounds  Outcome: Met This Shift  Note:   Lungs assessed this shift  Stated he had cough prior to admission, sputum was clear     Problem: Fluid Volume - Deficit:  Goal: Achieves intake and output within specified parameters  Description  Achieves intake and output within specified parameters  Outcome: Met This Shift  Note:   Remains on IV hydration, lasix given today     Problem: Gas Exchange - Impaired:  Goal: Levels of oxygenation will improve  Description  Levels of oxygenation will improve  Outcome: Met This Shift  Note:   Remains on room air, O2 >90     Problem: Discharge Planning:  Goal: Discharged to appropriate level of care  Description  Discharged to appropriate level of care  Outcome: Ongoing  Note:   Started on antibiotics  No discharge plans today    Care plan reviewed with patient. Patient verbalizes understanding of the plan of care and contributes to goal setting.

## 2019-07-10 NOTE — ED PROVIDER NOTES
[facility]  eMERGENCY dEPARTMENT eNCOUnter          [address] [facility]       Chief Complaint   Patient presents with    Emesis    Shortness of Breath       Nurses Notes reviewed and I agree except as noted in the HPI. HISTORY OF PRESENT ILLNESS    Yenny Morataya is a 82 y.o. male who presents to the Emergency Department for the evaluation of shortness of breath, vomiting, and fatigue. The patient reports he was diagnosed with pneumonia at [facility]. He reports he finished Zithromax yesterday. He reports he saw his PCP yesterday and received a shot of an antibiotic yesterday. He reports shortness of breath, weakness, fatigue, vomiting, and decreased appetite that is worsening. He reports constipation and decreased urine output. The patient reports he has an appointment tomorrow with Dr Wilfrido Marie. He reports he has a bad aortic valve and turned down surgery about 1 month ago but is discussing it again. He denies chest pain, abdominal pain, back pain, neck pain, or extremity pain. The patient has no further symptoms or complaints at initial encounter. The HPI was provided by the patient. REVIEW OF SYSTEMS     Review of Systems   Constitutional: Positive for fatigue. Negative for appetite change, chills and fever. HENT: Negative for congestion, ear pain, rhinorrhea and sore throat. Eyes: Negative for discharge, redness and visual disturbance. Respiratory: Positive for shortness of breath. Negative for cough and wheezing. Cardiovascular: Negative for chest pain, palpitations and leg swelling. Gastrointestinal: Positive for constipation and vomiting. Negative for abdominal pain, diarrhea and nausea. Genitourinary: Positive for decreased urine volume. Negative for difficulty urinating, dysuria and hematuria. Musculoskeletal: Negative for arthralgias, back pain, joint swelling and neck pain. Skin: Negative for pallor and rash. Allergic/Immunologic: Negative for environmental allergies. Reviewed - No data to display    EMERGENCY DEPARTMENT COURSE:   Vitals:    Vitals:    07/10/19 0427 07/10/19 0428   BP: (!) 128/93    Pulse: 110    Resp: 20    SpO2: 97%    Weight:  189 lb (85.7 kg)   Height:  5' 8\" (1.727 m)       4:30 AM: The patient was seen and evaluated. MDM:  Pertinent Labs & Imaging studies reviewed. (See chart for details)    The patient was seen and evaluated within the ED today with emesis and shortness of breath. Within the department, I observed the patient's vital signs to be within acceptable range. On exam, I appreciated findings as documented in the physical exam.  Radiological studies and laboratory work was ordered and is pending. within the department, the patient was treated with IV fluids. I observed the patient's condition to remain stable during the duration of the stay and I explained my proposed course of treatment to the patient, who was amenable to my decision. Patient was signed off at the end of my shift to Nupur Leonard PA-C. Please see her note for final disposition and plan. CRITICAL CARE:   None     CONSULTS:  none    PROCEDURES:  None    FINAL IMPRESSION    Dehydration  Sepsis  Pneumonia    DISPOSITION/PLAN       PATIENT REFERRED TO:  No follow-up provider specified. DISCHARGE MEDICATIONS:  New Prescriptions    No medications on file       (Please note that portions of this note were completed with a voice recognition program.  Effortswere made to edit the dictations but occasionally words are mis-transcribed.)    The patient was given an opportunity to see the Emergency Attending. The patient voiced understanding that I was a Mid-Level Provider and was in agreement with being seen independently by myself. Scribe:  Malinda Chin 7/10/19 4:30 AM Scribing for and in the presence of CRISSY Stewart.     Signed by: Bimal Hodges, 07/10/19 4:30 AM    Provider:  I personally performed the services described in the documentation, reviewed and edited the documentation which was dictated to the scribe in my presence, and it accurately records my words and actions.     Trena Blanco CNP 7/10/19 4:30 AM       TAMAR Prater - CNP  07/10/19 2985

## 2019-07-10 NOTE — ED NOTES
Patient resting on side of bed with significant other at bedside. Patient has no complaints at this time. Respirations labored and slightly tachycardic. 2nd bolus of 0.9 Sodium Chloride started. Patient is currently belching and states no pain at this time.       Darin Simons RN  07/10/19 1375

## 2019-07-10 NOTE — ED PROVIDER NOTES
pneumothorax. Heart: There is stable mild cardiomegaly. Mediastinum/cornel: No obvious mass or adenopathy. There is again a large calcified lymph node in the right tracheobronchial angle region. Skeleton: No significant bone or joint abnormality. Soft tissues: There are again surgical clips in the right neck. Improving interstitial infiltrates. Stable tiny right pleural effusion. Mild cardiomegaly. **This report has been created using voice recognition software. It may contain minor errors which are inherent in voice recognition technology. ** Final report electronically signed by Dr. Terri Romo on 7/10/2019 5:18 AM        ED VITALS    BP: 110/64, Temp: 97.7 °F (36.5 °C), Pulse: 101, Resp: 30, SpO2: 95 %    DIAGNOSIS:     1. Pneumonia due to organism    2.  Septicemia ([address]    3. Dehydration        DISPOSITION     Admit          Erika Naik PA-C  07/10/19 [address] [facility] Mariely Crooks PA-C  07/10/19 3665

## 2019-07-10 NOTE — ED NOTES
Respiratory at bedside doing breathing treatment. Patient resting in bed in a fowlers position at this time. Patient is tachycardic and labored in breathing. Patient and significant other updated on admission plans.       Howard Echavarria RN  07/10/19 6291

## 2019-07-10 NOTE — LETTER
Beneficiary Notification Letter     This [place] Provider is Participating in an Innovative Payment and [address] [place] [address] [place] from Bayron Fuentes:   Nica is participating in a Medicare initiative called the [facility] for [address] [facility]. You are receiving this letter because your health care provider has identified you as a patient who is receiving care through this initiative. Health care providers participating in the [facility] [address] [facility], including Nica, will work with Medicare to improve care for patients. Your Medicare rights have not been changed. You still have all the same Medicare rights and protections, including the right to choose which hospital, doctor, or other health care provider you see. However, because Nica chose to participate in the [address] [place] [address], all Medicare beneficiaries who meet the eligibility criteria of this initiative will receive care under the initiative. If you do not wish to receive care under the Bundled Payments [facility] [address] [facility], you must choose a health care provider that does not participate in this initiative for your care. Regardless of which health care provider you see, Medicare will continue to cover all of your medically necessary services. Bundled Payments for Care Improvement Advanced aims to help improve your care     The Bundled Payments [facility] [address] [facility] is an innovative Medicare initiative that encourages your doctors, hospitals, and other health care providers to work more closely together so you get better care during and following certain hospital stays.  In this initiative, doctors and hospitals may work closely with certain health care providers and suppliers that help patients recover after discharge from the hospital, including skilled nursing facilities, home health agencies, inpatient rehabilitation facilities, and long term care hospitals. [facility] is working closely with the doctors and other health care providers that care for you during and following your hospital stay and for a period of time after you leave the hospital. By working together, the health care providers are trying to more efficiently provide well-managed, high quality, patient-centered care as you undergo treatment. Hospitals, doctors, and other health care providers that care for you following a hospital stay may receive an additional payment for providing better, more coordinated health care. Medicare will monitor your care to make sure you and others get high quality care. Your feedback is important     Medicare may also ask you to answer a survey about the services and care you received from [facility]. The survey will be mailed to you. Your feedback will improve care for all people with Medicare who receive care from [facility]. Completion of this survey is optional.     Get more information     For more information about the Bundled Payments for [address] [place] [address], you can:    · Visit the CMS BPCI Advanced Website at http://sesay-denton.net/ initiatives/bpci-advanced   · Call the [facility] BPCI-A team at ([phone] [phone]. · Call 1-800-MEDICARE (8-594.463.5575). TTY users can call 4-862.600.3819     If you have concerns or complaints about your care, talk to your health care provider, or contact your Beneficiary and Family Centered Quality Improvement Organization [facility]). To get your CC-QIO's phone number, visit Medicare.gov/contacts or call 1-800-MEDICARE. · To find a different hospital, visit www. hospitalcompare.[facility].gov or call 1-800- MEDICARE (7-694.783.6172). TTY users should call 8-773.883.5526. · To find a different doctor, visit Medicare's Physician Compare website, [employer]ocracyTapes.co.nz, or call 1-800-MEDICARE (455 4528). TTY users should call 0-923.496.3142. · To find a different skilled nursing facility, visit [facility] website, https://www.Tengaged/, or call 1-800-MEDICARE (1- 628.121.6010). TTY users should call 5-788.774.9391. · To find a different long term care hospital, visit [facility] [address] Compare website, [employer].be, or call 1-800- MEDICARE (000 9344). TTY users should call 2-407.718.1905. · To find a different inpatient rehabilitation facility, visit [facility] [address] Compare website, www.medicare.gov/ inpatientrehabilitation facilitycompare, or call 1-800-MEDICARE (3-407.359.8422). TTY users should call 5- 851.918.6409. · To find a different home health agency, visit [address] website, www.medicare.gov/homehealthcompare, or call 1-800-MEDICARE (1-797- 590-7865). TTY users should call [phone].

## 2019-07-10 NOTE — H&P
500 mg by mouth 4 times daily   Yes Historical Provider, MD   clopidogrel (PLAVIX) 75 MG tablet Take 75 mg by mouth daily   Yes Historical Provider, MD   pantoprazole sodium (PROTONIX) 40 MG PACK packet Take 40 mg by mouth every morning (before breakfast)   Yes Historical Provider, MD   atenolol (TENORMIN) 50 MG tablet Take 50 mg by mouth daily    Historical Provider, MD   fosinopril (MONOPRIL) 20 MG tablet Take 20 mg by mouth daily    Historical Provider, MD   lovastatin (MEVACOR) 40 MG tablet Take 40 mg by mouth nightly    Historical Provider, MD       Allergies:  Patient has no known allergies. Social History:      The patient currently lives with his wife. TOBACCO:   reports that he quit smoking about 50 years ago. He started smoking about 70 years ago. He has never used smokeless tobacco.  ETOH:   reports that he drank alcohol. Family History:       Positive as follows:        Problem Relation Age of Onset    Hypertension Mother     Diabetes Mother     Heart Attack Brother        Diet:  Renal, carb controlled, low sodium    REVIEW OF SYSTEMS:   Pertinent positives as noted in the HPI. All other systems reviewed and negative. PHYSICAL EXAM:    /84   Pulse 110   Temp 97.7 °F (36.5 °C) (Oral)   Resp 28   Ht 5' 8\" (1.727 m)   Wt 189 lb (85.7 kg)   SpO2 97%   BMI 28.74 kg/m²     General appearance:  No apparent distress, appears stated age and cooperative. HEENT:  Normal cephalic, atraumatic without obvious deformity. Pupils equal, round, and reactive to light. Extra ocular muscles intact. Conjunctivae/corneas clear. Neck: Supple, with full range of motion. No jugular venous distention. Trachea midline. Respiratory:  Normal respiratory effort. + crackles. Cardiovascular:  Regular rate and rhythm with normal S1/S2 + murmurs, rubs or gallops. + bilateral lower ext edema. Abdomen: Soft, non-tender, non-distended with normal bowel sounds.   Musculoskeletal:  No clubbing, cyanosis or

## 2019-07-11 ENCOUNTER — APPOINTMENT (OUTPATIENT)
Dept: CARDIAC CATH/INVASIVE PROCEDURES | Age: 84
DRG: 291 | End: 2019-07-11
Payer: MEDICARE

## 2019-07-11 ENCOUNTER — APPOINTMENT (OUTPATIENT)
Dept: ULTRASOUND IMAGING | Age: 84
DRG: 291 | End: 2019-07-11
Payer: MEDICARE

## 2019-07-11 LAB
ALBUMIN SERPL-MCNC: 3.4 G/DL (ref 3.5–5.1)
ALP BLD-CCNC: 82 U/L (ref 38–126)
ALT SERPL-CCNC: 377 U/L (ref 11–66)
ANION GAP SERPL CALCULATED.3IONS-SCNC: 15 MEQ/L (ref 8–16)
AST SERPL-CCNC: 402 U/L (ref 5–40)
BASOPHILS # BLD: 0.2 %
BASOPHILS ABSOLUTE: 0 THOU/MM3 (ref 0–0.1)
BILIRUB SERPL-MCNC: 0.6 MG/DL (ref 0.3–1.2)
BILIRUBIN DIRECT: 0.3 MG/DL (ref 0–0.3)
BUN BLDV-MCNC: 45 MG/DL (ref 7–22)
CALCIUM SERPL-MCNC: 8.9 MG/DL (ref 8.5–10.5)
CHLORIDE BLD-SCNC: 104 MEQ/L (ref 98–111)
CHLORIDE, URINE: < 20 MEQ/L
CO2: 20 MEQ/L (ref 23–33)
CREAT SERPL-MCNC: 1.6 MG/DL (ref 0.4–1.2)
CREATININE URINE: 178 MG/DL
EOSINOPHIL # BLD: 0.3 %
EOSINOPHIL SMEAR: NORMAL
EOSINOPHILS ABSOLUTE: 0 THOU/MM3 (ref 0–0.4)
ERYTHROCYTE [DISTWIDTH] IN BLOOD BY AUTOMATED COUNT: 15.1 % (ref 11.5–14.5)
ERYTHROCYTE [DISTWIDTH] IN BLOOD BY AUTOMATED COUNT: 51.9 FL (ref 35–45)
GFR SERPL CREATININE-BSD FRML MDRD: 42 ML/MIN/1.73M2
GLUCOSE BLD-MCNC: 101 MG/DL (ref 70–108)
GLUCOSE BLD-MCNC: 111 MG/DL (ref 70–108)
GLUCOSE BLD-MCNC: 119 MG/DL (ref 70–108)
GLUCOSE BLD-MCNC: 157 MG/DL (ref 70–108)
GLUCOSE BLD-MCNC: 83 MG/DL (ref 70–108)
HCT VFR BLD CALC: 32 % (ref 42–52)
HEMOGLOBIN: 10.6 GM/DL (ref 14–18)
IMMATURE GRANS (ABS): 0.07 THOU/MM3 (ref 0–0.07)
IMMATURE GRANULOCYTES: 0.4 %
LACTIC ACID: 1.8 MMOL/L (ref 0.5–2.2)
LACTIC ACID: 2.5 MMOL/L (ref 0.5–2.2)
LV EF: 28 %
LVEF MODALITY: NORMAL
LYMPHOCYTES # BLD: 10.9 %
LYMPHOCYTES ABSOLUTE: 1.8 THOU/MM3 (ref 1–4.8)
MCH RBC QN AUTO: 33.4 PG (ref 26–33)
MCHC RBC AUTO-ENTMCNC: 33.1 GM/DL (ref 32.2–35.5)
MCV RBC AUTO: 100.9 FL (ref 80–94)
MONOCYTES # BLD: 10.1 %
MONOCYTES ABSOLUTE: 1.7 THOU/MM3 (ref 0.4–1.3)
NUCLEATED RED BLOOD CELLS: 0 /100 WBC
PLATELET # BLD: 307 THOU/MM3 (ref 130–400)
PMV BLD AUTO: 11.2 FL (ref 9.4–12.4)
POTASSIUM REFLEX MAGNESIUM: 4.9 MEQ/L (ref 3.5–5.2)
POTASSIUM, URINE: 85.3 MEQ/L
PROT/CREAT RATIO, UR: 0.25
PROTEIN, URINE: 44.8 MG/DL
RBC # BLD: 3.17 MILL/MM3 (ref 4.7–6.1)
SEG NEUTROPHILS: 78.1 %
SEGMENTED NEUTROPHILS ABSOLUTE COUNT: 12.8 THOU/MM3 (ref 1.8–7.7)
SODIUM BLD-SCNC: 139 MEQ/L (ref 135–145)
SODIUM URINE: < 20 MEQ/L
SPECIMEN: NORMAL
TOTAL PROTEIN: 6.9 G/DL (ref 6.1–8)
WBC # BLD: 16.4 THOU/MM3 (ref 4.8–10.8)

## 2019-07-11 PROCEDURE — 80076 HEPATIC FUNCTION PANEL: CPT

## 2019-07-11 PROCEDURE — 99232 SBSQ HOSP IP/OBS MODERATE 35: CPT | Performed by: PHYSICIAN ASSISTANT

## 2019-07-11 PROCEDURE — 82948 REAGENT STRIP/BLOOD GLUCOSE: CPT

## 2019-07-11 PROCEDURE — 2580000003 HC RX 258: Performed by: NURSE PRACTITIONER

## 2019-07-11 PROCEDURE — 94761 N-INVAS EAR/PLS OXIMETRY MLT: CPT

## 2019-07-11 PROCEDURE — 2060000000 HC ICU INTERMEDIATE R&B

## 2019-07-11 PROCEDURE — 84300 ASSAY OF URINE SODIUM: CPT

## 2019-07-11 PROCEDURE — 83605 ASSAY OF LACTIC ACID: CPT

## 2019-07-11 PROCEDURE — 82436 ASSAY OF URINE CHLORIDE: CPT

## 2019-07-11 PROCEDURE — 6370000000 HC RX 637 (ALT 250 FOR IP): Performed by: NURSE PRACTITIONER

## 2019-07-11 PROCEDURE — 6360000002 HC RX W HCPCS: Performed by: NURSE PRACTITIONER

## 2019-07-11 PROCEDURE — 76705 ECHO EXAM OF ABDOMEN: CPT

## 2019-07-11 PROCEDURE — 2700000000 HC OXYGEN THERAPY PER DAY

## 2019-07-11 PROCEDURE — 85025 COMPLETE CBC W/AUTO DIFF WBC: CPT

## 2019-07-11 PROCEDURE — 84133 ASSAY OF URINE POTASSIUM: CPT

## 2019-07-11 PROCEDURE — 99232 SBSQ HOSP IP/OBS MODERATE 35: CPT | Performed by: INTERNAL MEDICINE

## 2019-07-11 PROCEDURE — 89190 NASAL SMEAR FOR EOSINOPHILS: CPT

## 2019-07-11 PROCEDURE — 84156 ASSAY OF PROTEIN URINE: CPT

## 2019-07-11 PROCEDURE — 93306 TTE W/DOPPLER COMPLETE: CPT

## 2019-07-11 PROCEDURE — 51798 US URINE CAPACITY MEASURE: CPT

## 2019-07-11 PROCEDURE — 94640 AIRWAY INHALATION TREATMENT: CPT

## 2019-07-11 PROCEDURE — 80048 BASIC METABOLIC PNL TOTAL CA: CPT

## 2019-07-11 PROCEDURE — 36415 COLL VENOUS BLD VENIPUNCTURE: CPT

## 2019-07-11 PROCEDURE — 82570 ASSAY OF URINE CREATININE: CPT

## 2019-07-11 PROCEDURE — 99222 1ST HOSP IP/OBS MODERATE 55: CPT | Performed by: INTERNAL MEDICINE

## 2019-07-11 RX ADMIN — IPRATROPIUM BROMIDE AND ALBUTEROL SULFATE 1 AMPULE: .5; 3 SOLUTION RESPIRATORY (INHALATION) at 17:06

## 2019-07-11 RX ADMIN — IPRATROPIUM BROMIDE AND ALBUTEROL SULFATE 1 AMPULE: .5; 3 SOLUTION RESPIRATORY (INHALATION) at 08:33

## 2019-07-11 RX ADMIN — DILTIAZEM HYDROCHLORIDE 240 MG: 240 CAPSULE, COATED, EXTENDED RELEASE ORAL at 08:09

## 2019-07-11 RX ADMIN — Medication 10 ML: at 08:09

## 2019-07-11 RX ADMIN — SIMVASTATIN 20 MG: 20 TABLET, FILM COATED ORAL at 20:27

## 2019-07-11 RX ADMIN — IPRATROPIUM BROMIDE AND ALBUTEROL SULFATE 1 AMPULE: .5; 3 SOLUTION RESPIRATORY (INHALATION) at 22:14

## 2019-07-11 RX ADMIN — HEPARIN SODIUM 5000 UNITS: 5000 INJECTION INTRAVENOUS; SUBCUTANEOUS at 20:34

## 2019-07-11 ASSESSMENT — PAIN SCALES - GENERAL
PAINLEVEL_OUTOF10: 0

## 2019-07-11 NOTE — PLAN OF CARE
stability  Outcome: Ongoing  Note:   Patient's vital signs remain within normal range, being monitored q4 hrs. Problem: Respiratory  Goal: No pulmonary complications  Outcome: Ongoing  Note:   Patient has not experienced any new pulmonary complications this shift. Patient's lungs are clear/diminished throughout. Patient anxious at times and complains of being SOB. SpO2 remained above 92% on room air. Patient placed on 1L NC for comfort. Problem: Respiratory  Goal: O2 Sat > 90%  Outcome: Ongoing  Note:   SpO2 remained above 92% on room air. Patient placed on 1L NC for comfort. Problem:   Goal: Adequate urinary output  Outcome: Ongoing  Note:   Patient has low UO this shift. Will continue to monitor. Problem: Nutrition  Goal: Optimal nutrition therapy  Outcome: Ongoing  Note:   Patient is on a renal, low sodium low carb diet. Patient NPO after midnight. Problem: Musculor/Skeletal Functional Status  Goal: Absence of falls  Outcome: Ongoing  Note:   Patient has remained free of falls. Fall precautions are in place, bed alarm on and call light within reach. Care plan reviewed with patient. Patient verbalized understanding.

## 2019-07-11 NOTE — PROGRESS NOTES
status closely with #1.-Resolving likely secondary to chronic kidney disease  6. Lactic acidosis, hydration cautiously. Trend lactic acid. Hold Metformin. 7. Hyperkalemia. Hold ACE. Repeat BMP at 1200.   8. Severe aortic stenosis. Offered TAVR, patient considering. 9. Type 2 diabetes, uncontrolled. Hold oral antidiabetic medications. Carb control meals. Humalog SSI. Trend glucose. 10. Leukocytosis. ? If he received steroids in PCP office. PCT 0.09. BC pending. Possible Sepsis. Tachycardic, RR 25.   11. Elevated LFTs,-check hepatitis panel-ultrasound of the liver-LFT a.m. 12. Macrocytic anemia. Hbg stable at 10.8. 13. Right CEA, on statin/Plavix. Subjective-  Shortness of breath better  No chest pain, no dizziness no nausea no vomiting    Past medical history, family history, social history and allergies reviewed again and is unchanged since admission. ROS (12 point review of systems completed. Pertinent positives noted. Otherwise ROS is negative)     Scheduled Meds:   clopidogrel  75 mg Oral Daily    diltiazem  240 mg Oral Daily    simvastatin  20 mg Oral Nightly    pantoprazole  40 mg Oral QAM AC    insulin lispro  0-6 Units Subcutaneous TID WC    insulin lispro  0-3 Units Subcutaneous Nightly    sodium chloride flush  10 mL Intravenous 2 times per day    heparin (porcine)  5,000 Units Subcutaneous 3 times per day    ipratropium-albuterol  1 ampule Inhalation Q4H WA    [START ON 7/12/2019] levofloxacin  750 mg Intravenous Q48H     Continuous Infusions:   sodium chloride 50 mL/hr at 07/10/19 1538     PRN Meds:.sodium chloride flush, magnesium hydroxide, ondansetron    Diet:  Renal, carb controlled, low sodium    REVIEW OF SYSTEMS:   Pertinent positives as noted in the HPI. All other systems reviewed and negative.     PHYSICAL EXAM:    /67   Pulse 93   Temp 98.1 °F (36.7 °C) (Oral)   Resp 18   Ht 5' 8\" (1.727 m)   Wt 193 lb 4.8 oz (87.7 kg)   SpO2 99%   BMI 29.39 kg/m² General appearance:  No apparent distress, appears stated age and cooperative. HEENT:  Normal cephalic, atraumatic without obvious deformity. Pupils equal, round, and reactive to light. Extra ocular muscles intact. Conjunctivae/corneas clear. Neck: Supple, with full range of motion. No jugular venous distention. Trachea midline. Respiratory:  Normal respiratory effort. + crackles. Cardiovascular:  Regular rate and rhythm with normal S1/S2 + murmurs, rubs or gallops. + bilateral lower ext edema. Abdomen: Soft, non-tender, non-distended with normal bowel sounds. Musculoskeletal:  No clubbing, cyanosis or edema bilaterally. Full range of motion without deformity. Skin: Skin color, texture, turgor normal.  No rashes or lesions. Neurologic:  Neurovascularly intact without any focal sensory/motor deficits. Cranial nerves: II-XII intact, grossly non-focal.  Psychiatric:  Alert and oriented, thought content appropriate, normal insight  Capillary Refill: Brisk,< 3 seconds   Peripheral Pulses: +2 palpable, equal bilaterally       Labs:     Recent Labs     07/10/19  0425 07/11/19  0525   WBC 17.8* 16.4*   HGB 10.8* 10.6*   HCT 33.0* 32.0*   * 307     Recent Labs     07/10/19  0510 07/10/19  1244 07/11/19  0525    138 139   K 5.7* 5.3* 4.9   CL 99 102 104   CO2 14* 16* 20*   BUN 37* 39* 45*   CREATININE 1.6* 1.7* 1.6*   CALCIUM 9.1 8.8 8.9     Recent Labs     07/10/19  0510 07/11/19  0525   * 402*   * 377*   BILIDIR 0.4* 0.3   BILITOT 1.1 0.6   ALKPHOS 95 82     No results for input(s): INR in the last 72 hours. No results for input(s): Paul Jimena in the last 72 hours. Urinalysis:      Lab Results   Component Value Date    NITRU NEGATIVE 07/10/2019    WBCUA 15-25 07/10/2019    BACTERIA NONE 07/10/2019    RBCUA 0-2 07/10/2019    BLOODU LARGE 07/10/2019    GLUCOSEU NEGATIVE 07/10/2019       Intake & Output:  I/O last 3 completed shifts:   In: 1280.9 [P.O.:200;

## 2019-07-11 NOTE — CARE COORDINATION
19, 9:45 AM      Tai Rubio       Admitted from: ED 7/10/2019/ 30 [facility] day: 1   Location: [facility]10/010-A Reason for admit: Pneumonia [J18.9] Status: IP  Admit order signed?: yes  PMH:  has a past medical history of Abnormality of aortic valve, Cancer ([address] Carotid artery narrowing, Diabetes mellitus ([facility] [address] and Hypertension. Procedure:   ECHO   Cardiac Cath  possible TAVR planned per report-discussed at rounds  Pertinent abnormal Imagin/10  Moderate bilateral pleural effusions with bilateral lower lobe atelectasis and possible mild pulmonary edema. Mediastinal adenopathy. Multiple small gallstones in the gallbladder. No biliary dilatation. Nonobstructing left renal calculi. No hydroureteronephrosis. 8.1 x 6.3 cm left renal cyst.  Moderate stenosis of the proximal left common iliac artery. Medications:  Scheduled Meds:   clopidogrel  75 mg Oral Daily    diltiazem  240 mg Oral Daily    simvastatin  20 mg Oral Nightly    pantoprazole  40 mg Oral QAM AC    insulin lispro  0-6 Units Subcutaneous TID WC    insulin lispro  0-3 Units Subcutaneous Nightly    sodium chloride flush  10 mL Intravenous 2 times per day    heparin (porcine)  5,000 Units Subcutaneous 3 times per day    ipratropium-albuterol  1 ampule Inhalation Q4H WA    [START ON 2019] levofloxacin  750 mg Intravenous Q48H     Continuous Infusions:   sodium chloride 50 mL/hr at 07/10/19 1538      Pertinent Info/Orders/Treatment Plan: recently treated for Pneumonia at [facility]. IVF/Oxygen 1L continued. Creatinine 1.6, WBC 16.4, elevated LFT/s, elevated BNP/Troponin; monitor CTS/Palliative Care/Nephrology(CTA optimization and need for pre TAVR cardiac cath) following  Diet: Diet NPO, After Midnight   Smoking status:  reports that he quit smoking about 50 years ago. He started smoking about 70 years ago.  He has never used smokeless tobacco.   PCP: Yasmin Guillen MD  Readmission: none  Readmission Risk Score: 13%    Discharge

## 2019-07-11 NOTE — PROGRESS NOTES
LABVLDL    TSH:  No results found for: TSH      Assessment:    Acute on chronic systolic CHF  Critical AS - MICHELLE 0.3 cm2 - pt agreeable for TAVR w/up  Ef 30-35 per echo 5/7/19  Elevated LFTs  HTN  HLD  DM    Plan:  · Daily I/o and weights  · 2 liter fluid restriction and 2 gm sodium diet  · Dr Tresa Gaspar consulted for evaluation for TAVR and has ordered for LHC once ok with renal  · Start diuretics once ok with renal  · ?stop cdz and add BB  · Avoid nitrates  · lifevest     Electronically signed by Katia Porter PA-C on 7/11/2019 at 11:37 AM

## 2019-07-11 NOTE — CONSULTS
mg by mouth every morning (before breakfast)   Yes Historical Provider, MD   atenolol (TENORMIN) 50 MG tablet Take 50 mg by mouth daily    Historical Provider, MD   fosinopril (MONOPRIL) 20 MG tablet Take 20 mg by mouth daily    Historical Provider, MD   lovastatin (MEVACOR) 40 MG tablet Take 40 mg by mouth nightly    Historical Provider, MD       Review of Systems:  Constitutional: no fever or chills  Head: No headaches  Eyes: no blurry vision, no discharge  Ears: no ear pain or hearing changes  Nose: no runny nose or epistaxis  Respiratory: + shortness of breath   Cardiovascular: no chest pain, + edema  GI: +dry heaves with nausea, vomiting  : denies any hematuria, no flank pain  Skin: no rash  Musculoskeletal: no joint pain, moves all ext  Neuro: no tremor, no slurred speech  Psychiatric: stable mood, no depression or insomnia    Current Meds:  Infusion:    sodium chloride 50 mL/hr at 07/10/19 1538     Meds:    clopidogrel  75 mg Oral Daily    diltiazem  240 mg Oral Daily    simvastatin  20 mg Oral Nightly    pantoprazole  40 mg Oral QAM AC    insulin lispro  0-6 Units Subcutaneous TID WC    insulin lispro  0-3 Units Subcutaneous Nightly    sodium chloride flush  10 mL Intravenous 2 times per day    heparin (porcine)  5,000 Units Subcutaneous 3 times per day    ipratropium-albuterol  1 ampule Inhalation Q4H WA    [START ON 7/12/2019] levofloxacin  750 mg Intravenous Q48H     Meds prn: sodium chloride flush, magnesium hydroxide, ondansetron     Allergies/Intolerances: ALLERGIES: Patient has no known allergies. 24HR INTAKE/OUTPUT:      Intake/Output Summary (Last 24 hours) at 7/11/2019 1130  Last data filed at 7/11/2019 0814  Gross per 24 hour   Intake 786.91 ml   Output 450 ml   Net 336.91 ml     I/O last 3 completed shifts: In: 776.9 [P.O.:200;  I.V.:576.9]  Out: 300 [Urine:300]  I/O this shift:  In: 10 [I.V.:10]  Out: 150 [Urine:150]  Admission weight: 189 lb (85.7 kg)  Wt Readings from Last 3

## 2019-07-12 ENCOUNTER — APPOINTMENT (OUTPATIENT)
Dept: NUCLEAR MEDICINE | Age: 84
DRG: 291 | End: 2019-07-12
Payer: MEDICARE

## 2019-07-12 LAB
ALBUMIN SERPL-MCNC: 3.5 G/DL (ref 3.5–5.1)
ALP BLD-CCNC: 97 U/L (ref 38–126)
ALT SERPL-CCNC: 483 U/L (ref 11–66)
AMMONIA: 27 UMOL/L (ref 11–60)
ANION GAP SERPL CALCULATED.3IONS-SCNC: 17 MEQ/L (ref 8–16)
AST SERPL-CCNC: 388 U/L (ref 5–40)
BILIRUB SERPL-MCNC: 0.8 MG/DL (ref 0.3–1.2)
BILIRUBIN DIRECT: 0.4 MG/DL (ref 0–0.3)
BUN BLDV-MCNC: 44 MG/DL (ref 7–22)
CALCIUM SERPL-MCNC: 8.7 MG/DL (ref 8.5–10.5)
CHLORIDE BLD-SCNC: 104 MEQ/L (ref 98–111)
CO2: 18 MEQ/L (ref 23–33)
CREAT SERPL-MCNC: 1.4 MG/DL (ref 0.4–1.2)
ERYTHROCYTE [DISTWIDTH] IN BLOOD BY AUTOMATED COUNT: 15.6 % (ref 11.5–14.5)
ERYTHROCYTE [DISTWIDTH] IN BLOOD BY AUTOMATED COUNT: 53.2 FL (ref 35–45)
GFR SERPL CREATININE-BSD FRML MDRD: 48 ML/MIN/1.73M2
GLUCOSE BLD-MCNC: 118 MG/DL (ref 70–108)
GLUCOSE BLD-MCNC: 128 MG/DL (ref 70–108)
GLUCOSE BLD-MCNC: 184 MG/DL (ref 70–108)
GLUCOSE BLD-MCNC: 93 MG/DL (ref 70–108)
GLUCOSE BLD-MCNC: 98 MG/DL (ref 70–108)
HAV IGM SER IA-ACNC: NEGATIVE
HCT VFR BLD CALC: 31.5 % (ref 42–52)
HEMOGLOBIN: 10.1 GM/DL (ref 14–18)
HEPATITIS B CORE IGM ANTIBODY: NEGATIVE
HEPATITIS B SURFACE ANTIGEN: NEGATIVE
HEPATITIS C ANTIBODY: NEGATIVE
MCH RBC QN AUTO: 32.9 PG (ref 26–33)
MCHC RBC AUTO-ENTMCNC: 32.1 GM/DL (ref 32.2–35.5)
MCV RBC AUTO: 102.6 FL (ref 80–94)
MRSA SCREEN: NORMAL
ORGANISM: ABNORMAL
PLATELET # BLD: 291 THOU/MM3 (ref 130–400)
PMV BLD AUTO: 11.2 FL (ref 9.4–12.4)
POTASSIUM SERPL-SCNC: 4.4 MEQ/L (ref 3.5–5.2)
RBC # BLD: 3.07 MILL/MM3 (ref 4.7–6.1)
SODIUM BLD-SCNC: 139 MEQ/L (ref 135–145)
TOTAL PROTEIN: 6.5 G/DL (ref 6.1–8)
URINE CULTURE REFLEX: ABNORMAL
URINE CULTURE REFLEX: ABNORMAL
WBC # BLD: 14.9 THOU/MM3 (ref 4.8–10.8)

## 2019-07-12 PROCEDURE — 3430000000 HC RX DIAGNOSTIC RADIOPHARMACEUTICAL: Performed by: INTERNAL MEDICINE

## 2019-07-12 PROCEDURE — 6370000000 HC RX 637 (ALT 250 FOR IP): Performed by: NURSE PRACTITIONER

## 2019-07-12 PROCEDURE — 94640 AIRWAY INHALATION TREATMENT: CPT

## 2019-07-12 PROCEDURE — 78226 HEPATOBILIARY SYSTEM IMAGING: CPT

## 2019-07-12 PROCEDURE — 6370000000 HC RX 637 (ALT 250 FOR IP): Performed by: INTERNAL MEDICINE

## 2019-07-12 PROCEDURE — 82140 ASSAY OF AMMONIA: CPT

## 2019-07-12 PROCEDURE — 99232 SBSQ HOSP IP/OBS MODERATE 35: CPT | Performed by: NURSE PRACTITIONER

## 2019-07-12 PROCEDURE — 85027 COMPLETE CBC AUTOMATED: CPT

## 2019-07-12 PROCEDURE — 80053 COMPREHEN METABOLIC PANEL: CPT

## 2019-07-12 PROCEDURE — 99233 SBSQ HOSP IP/OBS HIGH 50: CPT | Performed by: NURSE PRACTITIONER

## 2019-07-12 PROCEDURE — A9537 TC99M MEBROFENIN: HCPCS | Performed by: INTERNAL MEDICINE

## 2019-07-12 PROCEDURE — 36415 COLL VENOUS BLD VENIPUNCTURE: CPT

## 2019-07-12 PROCEDURE — 2709999900 HC NON-CHARGEABLE SUPPLY

## 2019-07-12 PROCEDURE — 6360000002 HC RX W HCPCS: Performed by: NURSE PRACTITIONER

## 2019-07-12 PROCEDURE — 6360000002 HC RX W HCPCS

## 2019-07-12 PROCEDURE — 82248 BILIRUBIN DIRECT: CPT

## 2019-07-12 PROCEDURE — 2060000000 HC ICU INTERMEDIATE R&B

## 2019-07-12 PROCEDURE — 2580000003 HC RX 258: Performed by: NURSE PRACTITIONER

## 2019-07-12 PROCEDURE — 80074 ACUTE HEPATITIS PANEL: CPT

## 2019-07-12 PROCEDURE — 82948 REAGENT STRIP/BLOOD GLUCOSE: CPT

## 2019-07-12 PROCEDURE — 6360000002 HC RX W HCPCS: Performed by: RADIOLOGY

## 2019-07-12 PROCEDURE — 99232 SBSQ HOSP IP/OBS MODERATE 35: CPT | Performed by: INTERNAL MEDICINE

## 2019-07-12 RX ORDER — LACTULOSE 10 G/15ML
20 SOLUTION ORAL 3 TIMES DAILY
Status: DISPENSED | OUTPATIENT
Start: 2019-07-12 | End: 2019-07-13

## 2019-07-12 RX ORDER — MORPHINE SULFATE 4 MG/ML
3.5 INJECTION, SOLUTION INTRAMUSCULAR; INTRAVENOUS ONCE
Status: COMPLETED | OUTPATIENT
Start: 2019-07-12 | End: 2019-07-12

## 2019-07-12 RX ADMIN — Medication 9.2 MILLICURIE: at 11:45

## 2019-07-12 RX ADMIN — IPRATROPIUM BROMIDE AND ALBUTEROL SULFATE 1 AMPULE: .5; 3 SOLUTION RESPIRATORY (INHALATION) at 16:39

## 2019-07-12 RX ADMIN — IPRATROPIUM BROMIDE AND ALBUTEROL SULFATE 1 AMPULE: .5; 3 SOLUTION RESPIRATORY (INHALATION) at 20:10

## 2019-07-12 RX ADMIN — HEPARIN SODIUM 5000 UNITS: 5000 INJECTION INTRAVENOUS; SUBCUTANEOUS at 06:18

## 2019-07-12 RX ADMIN — CLOPIDOGREL 75 MG: 75 TABLET, FILM COATED ORAL at 09:08

## 2019-07-12 RX ADMIN — HEPARIN SODIUM 5000 UNITS: 5000 INJECTION INTRAVENOUS; SUBCUTANEOUS at 20:26

## 2019-07-12 RX ADMIN — ONDANSETRON 4 MG: 2 INJECTION INTRAMUSCULAR; INTRAVENOUS at 09:08

## 2019-07-12 RX ADMIN — DILTIAZEM HYDROCHLORIDE 240 MG: 240 CAPSULE, COATED, EXTENDED RELEASE ORAL at 09:08

## 2019-07-12 RX ADMIN — HEPARIN SODIUM 5000 UNITS: 5000 INJECTION INTRAVENOUS; SUBCUTANEOUS at 15:56

## 2019-07-12 RX ADMIN — PANTOPRAZOLE SODIUM 40 MG: 40 TABLET, DELAYED RELEASE ORAL at 06:18

## 2019-07-12 RX ADMIN — LEVOFLOXACIN 750 MG: 5 INJECTION, SOLUTION INTRAVENOUS at 06:18

## 2019-07-12 RX ADMIN — INSULIN LISPRO 1 UNITS: 100 INJECTION, SOLUTION INTRAVENOUS; SUBCUTANEOUS at 20:26

## 2019-07-12 RX ADMIN — SIMVASTATIN 20 MG: 20 TABLET, FILM COATED ORAL at 20:25

## 2019-07-12 RX ADMIN — Medication 10 ML: at 09:08

## 2019-07-12 RX ADMIN — LACTULOSE 20 G: 20 SOLUTION ORAL at 20:25

## 2019-07-12 RX ADMIN — Medication 10 ML: at 20:26

## 2019-07-12 RX ADMIN — LACTULOSE 20 G: 20 SOLUTION ORAL at 15:56

## 2019-07-12 RX ADMIN — MORPHINE SULFATE 3.5 MG: 4 INJECTION INTRAVENOUS at 14:19

## 2019-07-12 ASSESSMENT — PAIN SCALES - GENERAL
PAINLEVEL_OUTOF10: 0

## 2019-07-12 NOTE — CARE COORDINATION
[address] Transitions Interview     2019    Patient: Cyrus Paige Patient : 1935   MRN: [id number]  Reason for Admission:   RARS: Readmission Risk Score: 14         Introduced self/role. Explained BPCI-A program and beneficiary letter. Patient verbalized understandment. Readmission Risk  Patient Active Problem List   Diagnosis    Pneumonia due to organism    Nonrheumatic aortic valve stenosis       Inpatient Assessment  Care Transitions Summary    Care Transitions Inpatient Review  Medication Review  Are you able to afford your medications?:  Yes  How often do you have difficulty taking your medications?:  I always take them as prescribed. Housing Review  Who do you live with?:  Partner/Spouse/SO  Are you an active caregiver in your home?:  No  Social Support  Do you have a ?:  No  Do you have a [address] [place] [address]?:  No  Durable Medical Equipment  Patient DME:  Bradly Jackson  Functional Review  Ability to seek help/take action for Emergent/Urgent situations i.e. fire, crime, inclement weather or health crisis. :  Independent  Ability handle personal hygiene needs (bathing/dressing/grooming): Independent  Ability to manage medications: Independent  Ability to prepare food:  Independent  Ability to maintain home (clean home, laundry): Independent  Ability to drive and/or has transportation:  Independent  Ability to do shopping:  Independent  Ability to manage finances: Independent  Is patient able to live independently?:  Yes  Hearing and Vision  Visual Impairment:  Visual impairment (Glasses/contacts)  Hearing Impairment:  None  Care Transitions Interventions  No Identified Needs         Follow Up  No future appointments. Health Maintenance  There are no preventive care reminders to display for this patient.     Angelica Caro RN

## 2019-07-12 NOTE — PROGRESS NOTES
separation.   DOPPLER: There was no evidence of tricuspid stenosis. There was mild   tricuspid regurgitation. Assuming RAP = 15 mmHg, the estimated RVSP = 43   mmHg.      Pulmonic Valve   The pulmonic valve leaflets were not well seen. DOPPLER: The transpulmonic   velocity was within the normal range with mild regurgitation.      Left Atrium   Left atrial size was moderately dilated.      Left Ventricle   Normal left ventricular size and severely reduced systolic function.   There was severe global hypokinesis.   Ejection fraction was estimated at 25-30%.  Doppler parameters were consistent with a reversible restrictive pattern,   indicative of decreased left ventricular diastolic compliance and/or   increased left atrial pressure (grade 3 diastolic dysfunction).      Right Atrium   Right atrial size was moderately dilated.      Right Ventricle   The right ventricular size was normal with normal systolic function and   wall thickness.      Pericardial Effusion   The pericardium was normal in appearance with no evidence of a pericardial   effusion.      Pleural Effusion   Left pleural effusion.      Aorta / Great Vessels   -Ascending aorta = 3.0 cm.   -IVC size is dilated with reduced respiratory phasic changes   (CVP~10-15 mmHg)           Lab Data:    Cardiac Enzymes:  No results for input(s): CKTOTAL, CKMB, CKMBINDEX, TROPONINI in the last 72 hours.     CBC:   Lab Results   Component Value Date    WBC 14.9 07/12/2019    RBC 3.07 07/12/2019    HGB 10.1 07/12/2019    HCT 31.5 07/12/2019     07/12/2019       CMP:  Lab Results   Component Value Date     07/12/2019    K 4.4 07/12/2019    K 4.9 07/11/2019     07/12/2019    CO2 18 07/12/2019    BUN 44 07/12/2019    CREATININE 1.4 07/12/2019    LABGLOM 48 07/12/2019    GLUCOSE 93 07/12/2019    CALCIUM 8.7 07/12/2019       Hepatic Function Panel:  Lab Results   Component Value Date    ALKPHOS 97 07/12/2019     07/12/2019     07/12/2019

## 2019-07-12 NOTE — PLAN OF CARE
crackles in the lung bases. Fluids turned off today. Problem: Fluid Volume - Deficit:  Goal: Achieves intake and output within specified parameters  Description  Achieves intake and output within specified parameters  7/12/2019 1104 by Ty Resendiz RN  Outcome: Ongoing  Note:   Patient urinating adequately. Patient has urinal for accurate I's and O's.  7/11/2019 2113 by Meir Ryder RN  Outcome: Ongoing  Note:   BLE remain edematous this shift, non-pitting. IVF continue at 50 mL/hr. Problem: Nutrition  Goal: Optimal nutrition therapy  Outcome: Ongoing  Note:   Patient eating little bits of meals. Patient having some nausea this shift. Problem: Impaired respiratory status  Goal: Lung sounds clear or within normal limits for patient  7/12/2019 1104 by Ty Resendiz RN  Outcome: Ongoing  7/11/2019 2217 by Chaya Chirinos RCP  Outcome: Ongoing   Care plan reviewed with patient and significant other. Patient and significant other verbalize understanding of the plan of care and contribute to goal setting.

## 2019-07-12 NOTE — PLAN OF CARE
Problem: Risk for Impaired Skin Integrity  Goal: Tissue integrity - skin and mucous membranes  Description  Structural intactness and normal physiological function of skin and  mucous membranes. Outcome: Ongoing  Note:   No new skin breakdown this shift. Patient is assisted with turning every two hours and as needed. Problem: Discharge Planning:  Goal: Discharged to appropriate level of care  Description  Discharged to appropriate level of care  Outcome: Ongoing  Note:   Planning discharge home with significant other. Problem: Fluid Volume - Deficit:  Goal: Achieves intake and output within specified parameters  Description  Achieves intake and output within specified parameters  Outcome: Ongoing  Note:   BLE remain edematous this shift, non-pitting. IVF continue at 50 mL/hr. Problem: OXYGENATION/RESPIRATORY FUNCTION  Goal: Patient will maintain patent airway  Outcome: Ongoing  Note:   Patient remains on room air with no pulmonary complications this shift. Problem: HEMODYNAMIC STATUS  Goal: Patient has stable vital signs and fluid balance  Outcome: Ongoing  Note:   VS stable this shift. Problem: Pain Control  Goal: Maintain pain level at or below patient's acceptable level (or 5 if patient is unable to determine acceptable level)  Outcome: Ongoing  Flowsheets (Taken 7/11/2019 2113)  Patient's Stated Pain Goal: No pain  Note:   Pain Assessment: 0-10  Pain Level: 0   Pain goal:  0   Is pain goal met at this time? Yes     Additional interventions to be implemented: position change and rest         Problem: Cardiovascular  Goal: No DVT, peripheral vascular complications  Outcome: Ongoing  Note:   No signs of DVT this shift. Patient remains on SQ Heparin. Problem:   Goal: Adequate urinary output  Outcome: Ongoing  Note:   PVR bladder scan ordered per physician. Problem: Musculor/Skeletal Functional Status  Goal: Absence of falls  Outcome: Ongoing  Note:   No falls this shift. Call light in reach and used appropriately. Bed alarm remains on. Care plan reviewed with patient. Patient verbalize understanding of the plan of care and contribute to goal setting.

## 2019-07-12 NOTE — PROGRESS NOTES
No evidence of acute biliary obstruction. 3. Right-sided pleural effusion present. created using voice recognition software. It may contain minor errors which are inherent in voice recognition technology. **      Final report electronically signed by Dr. Kusum Álvarez on 7/11/2019 8:37 PM      CT ABDOMEN PELVIS W IV CONTRAST Additional Contrast? None   Final Result   No acute inflammatory or infectious process in the abdomen or pelvis. Colonic diverticulosis without diverticulitis. Moderate stool throughout the colon. Moderate bilateral pleural effusions with bilateral lower lobe atelectasis and possible mild pulmonary edema. Mediastinal adenopathy. Multiple small gallstones in the gallbladder. No biliary dilatation. Nonobstructing left renal calculi. No hydroureteronephrosis. 8.1 x 6.3 cm left renal cyst.   Moderate stenosis of the proximal left common iliac artery. Enlarged prostate, correlate clinically. **This report has been created using voice recognition software. It may contain minor errors which are inherent in voice recognition technology. **      Final report electronically signed by Dr. Pat Garcia on 7/10/2019 6:24 AM      XR CHEST PORTABLE   Final Result      Improving interstitial infiltrates. Stable tiny right pleural effusion. Mild cardiomegaly. **This report has been created using voice recognition software. It may contain minor errors which are inherent in voice recognition technology. **      Final report electronically signed by Dr. Pat Garcia on 7/10/2019 5:18 AM      NM HEPATOBILIARY    (Results Pending)       DVT prophylaxis: [] Lovenox                                 [] SCDs                                 [x] SQ Heparin                                 [] Encourage ambulation           [] Already on Anticoagulation     Code Status: Full Code    PT/OT Eval Status: add    Tele:   [x] yes SR HR 78             [] no    Active Hospital Problems

## 2019-07-12 NOTE — PLAN OF CARE
Problem: Impaired respiratory status  Goal: Lung sounds clear or within normal limits for patient  Outcome: Ongoing   Breath sounds clear, continuing duoneb every 4 hours while awake.

## 2019-07-13 LAB
ANION GAP SERPL CALCULATED.3IONS-SCNC: 16 MEQ/L (ref 8–16)
BASE EXCESS (CALCULATED): -6.1 MMOL/L (ref -2.5–2.5)
BUN BLDV-MCNC: 44 MG/DL (ref 7–22)
CALCIUM SERPL-MCNC: 8.5 MG/DL (ref 8.5–10.5)
CHLORIDE BLD-SCNC: 103 MEQ/L (ref 98–111)
CO2: 18 MEQ/L (ref 23–33)
COLLECTED BY:: ABNORMAL
CREAT SERPL-MCNC: 1.1 MG/DL (ref 0.4–1.2)
DEVICE: ABNORMAL
GFR SERPL CREATININE-BSD FRML MDRD: 64 ML/MIN/1.73M2
GLUCOSE BLD-MCNC: 161 MG/DL (ref 70–108)
GLUCOSE BLD-MCNC: 168 MG/DL (ref 70–108)
GLUCOSE BLD-MCNC: 193 MG/DL (ref 70–108)
GLUCOSE BLD-MCNC: 193 MG/DL (ref 70–108)
HCO3: 18 MMOL/L (ref 23–28)
IFIO2: 21
O2 SATURATION: 95 %
PCO2: 29 MMHG (ref 35–45)
PH BLOOD GAS: 7.4 (ref 7.35–7.45)
PO2: 77 MMHG (ref 71–104)
POTASSIUM SERPL-SCNC: 4.7 MEQ/L (ref 3.5–5.2)
SODIUM BLD-SCNC: 137 MEQ/L (ref 135–145)
SOURCE, BLOOD GAS: ABNORMAL

## 2019-07-13 PROCEDURE — 36415 COLL VENOUS BLD VENIPUNCTURE: CPT

## 2019-07-13 PROCEDURE — 82948 REAGENT STRIP/BLOOD GLUCOSE: CPT

## 2019-07-13 PROCEDURE — 80048 BASIC METABOLIC PNL TOTAL CA: CPT

## 2019-07-13 PROCEDURE — 99232 SBSQ HOSP IP/OBS MODERATE 35: CPT | Performed by: INTERNAL MEDICINE

## 2019-07-13 PROCEDURE — 94640 AIRWAY INHALATION TREATMENT: CPT

## 2019-07-13 PROCEDURE — 6370000000 HC RX 637 (ALT 250 FOR IP): Performed by: PHYSICIAN ASSISTANT

## 2019-07-13 PROCEDURE — 2709999900 HC NON-CHARGEABLE SUPPLY

## 2019-07-13 PROCEDURE — 6360000002 HC RX W HCPCS: Performed by: NURSE PRACTITIONER

## 2019-07-13 PROCEDURE — 36600 WITHDRAWAL OF ARTERIAL BLOOD: CPT

## 2019-07-13 PROCEDURE — 99232 SBSQ HOSP IP/OBS MODERATE 35: CPT | Performed by: HOSPITALIST

## 2019-07-13 PROCEDURE — 82803 BLOOD GASES ANY COMBINATION: CPT

## 2019-07-13 PROCEDURE — 2580000003 HC RX 258: Performed by: NURSE PRACTITIONER

## 2019-07-13 PROCEDURE — 6370000000 HC RX 637 (ALT 250 FOR IP): Performed by: NURSE PRACTITIONER

## 2019-07-13 PROCEDURE — 2060000000 HC ICU INTERMEDIATE R&B

## 2019-07-13 RX ORDER — DEXTROSE MONOHYDRATE 25 G/50ML
12.5 INJECTION, SOLUTION INTRAVENOUS PRN
Status: DISCONTINUED | OUTPATIENT
Start: 2019-07-13 | End: 2019-07-18

## 2019-07-13 RX ORDER — DEXTROSE MONOHYDRATE 50 MG/ML
100 INJECTION, SOLUTION INTRAVENOUS PRN
Status: DISCONTINUED | OUTPATIENT
Start: 2019-07-13 | End: 2019-07-18

## 2019-07-13 RX ORDER — LORAZEPAM 1 MG/1
1 TABLET ORAL NIGHTLY PRN
Status: DISCONTINUED | OUTPATIENT
Start: 2019-07-13 | End: 2019-07-14

## 2019-07-13 RX ORDER — NICOTINE POLACRILEX 4 MG
15 LOZENGE BUCCAL PRN
Status: DISCONTINUED | OUTPATIENT
Start: 2019-07-13 | End: 2019-07-18

## 2019-07-13 RX ADMIN — INSULIN LISPRO 1 UNITS: 100 INJECTION, SOLUTION INTRAVENOUS; SUBCUTANEOUS at 12:08

## 2019-07-13 RX ADMIN — IPRATROPIUM BROMIDE AND ALBUTEROL SULFATE 1 AMPULE: .5; 3 SOLUTION RESPIRATORY (INHALATION) at 08:39

## 2019-07-13 RX ADMIN — INSULIN LISPRO 1 UNITS: 100 INJECTION, SOLUTION INTRAVENOUS; SUBCUTANEOUS at 07:36

## 2019-07-13 RX ADMIN — CLOPIDOGREL 75 MG: 75 TABLET, FILM COATED ORAL at 07:35

## 2019-07-13 RX ADMIN — LORAZEPAM 1 MG: 1 TABLET ORAL at 23:52

## 2019-07-13 RX ADMIN — IPRATROPIUM BROMIDE AND ALBUTEROL SULFATE 1 AMPULE: .5; 3 SOLUTION RESPIRATORY (INHALATION) at 12:27

## 2019-07-13 RX ADMIN — HEPARIN SODIUM 5000 UNITS: 5000 INJECTION INTRAVENOUS; SUBCUTANEOUS at 13:14

## 2019-07-13 RX ADMIN — HEPARIN SODIUM 5000 UNITS: 5000 INJECTION INTRAVENOUS; SUBCUTANEOUS at 05:38

## 2019-07-13 RX ADMIN — PANTOPRAZOLE SODIUM 40 MG: 40 TABLET, DELAYED RELEASE ORAL at 05:38

## 2019-07-13 RX ADMIN — SIMVASTATIN 20 MG: 20 TABLET, FILM COATED ORAL at 21:04

## 2019-07-13 RX ADMIN — DILTIAZEM HYDROCHLORIDE 240 MG: 240 CAPSULE, COATED, EXTENDED RELEASE ORAL at 07:36

## 2019-07-13 RX ADMIN — Medication 10 ML: at 07:35

## 2019-07-13 RX ADMIN — HEPARIN SODIUM 5000 UNITS: 5000 INJECTION INTRAVENOUS; SUBCUTANEOUS at 21:03

## 2019-07-13 ASSESSMENT — PAIN SCALES - GENERAL
PAINLEVEL_OUTOF10: 0
PAINLEVEL_OUTOF10: 0

## 2019-07-13 NOTE — CONSULTS
Subjective:     Patient is a 82 y.o.  male who was admitted with increasing SOB and REESE and congestive heart failure. Workup has revealed valvular disease and severe Aortic Stenosis. Cardiac catheterization demonstrated aortic stenosis with a valve area of 0.22 centimeters squared and a gradient of 58 mm Hg.  Cardiac risk factors include advanced age (older than 54 for men, 72 for women), diabetes mellitus, hypertension, male gender, obesity (BMI >= 30 kg/m2), sedentary lifestyle and smoking/ tobacco exposure    Patient Active Problem List    Diagnosis Date Noted    Critical aortic valve stenosis      Priority: High    Acute renal failure (HCC)      Priority: High    Nonrheumatic aortic valve stenosis      Priority: High    Acute on chronic systolic heart failure (HCC)     Pneumonia due to organism 07/10/2019     Past Medical History:   Diagnosis Date    Abnormality of aortic valve     Cancer ([facility] [address]     Right eye    Carotid artery narrowing     Diabetes mellitus ([facility] [address]     Hypertension       Past Surgical History:   Procedure Laterality Date    VASCULAR SURGERY        Medications Prior to Admission: glimepiride (AMARYL) 4 MG tablet, Take 4 mg by mouth every morning (before breakfast)  diltiazem (CARDIZEM CD) 240 MG extended release capsule, Take 240 mg by mouth daily  metFORMIN (GLUCOPHAGE) 500 MG tablet, Take 500 mg by mouth 4 times daily  clopidogrel (PLAVIX) 75 MG tablet, Take 75 mg by mouth daily  pantoprazole sodium (PROTONIX) 40 MG PACK packet, Take 40 mg by mouth every morning (before breakfast)  atenolol (TENORMIN) 50 MG tablet, Take 50 mg by mouth daily  fosinopril (MONOPRIL) 20 MG tablet, Take 20 mg by mouth daily  lovastatin (MEVACOR) 40 MG tablet, Take 40 mg by mouth nightly  No Known Allergies   Social History     Tobacco Use    Smoking status: Former Smoker     Start date: 5/15/1949     Last attempt to quit: 5/15/1969     Years since quittin.1    Smokeless tobacco: Never Used separation.   DOPPLER: There was no evidence of tricuspid stenosis. There was mild   tricuspid regurgitation. Assuming RAP = 15 mmHg, the estimated RVSP = 43   mmHg.      Pulmonic Valve   The pulmonic valve leaflets were not well seen. DOPPLER: The transpulmonic   velocity was within the normal range with mild regurgitation.      Left Atrium   Left atrial size was moderately dilated.      Left Ventricle   Normal left ventricular size and severely reduced systolic function.   There was severe global hypokinesis.   Ejection fraction was estimated at 25-30%.  Doppler parameters were consistent with a reversible restrictive pattern,   indicative of decreased left ventricular diastolic compliance and/or   increased left atrial pressure (grade 3 diastolic dysfunction).      Right Atrium   Right atrial size was moderately dilated.      Right Ventricle   The right ventricular size was normal with normal systolic function and   wall thickness.      Pericardial Effusion   The pericardium was normal in appearance with no evidence of a pericardial   effusion.      Pleural Effusion   Left pleural effusion.      Aorta / Great Vessels   -Ascending aorta = 3.0 cm.   -IVC size is dilated with reduced respiratory phasic changes   (CVP~10-15 mmHg)    Assessment:     TAVR Surgery represents the best option for treating this patient's aortic stenosis with a valve area of 0.22 centimeters squared and a gradient of 58 mm Hg      Plan:     I discussed the need to continue the work-up for TAVR  Patient and family states that they have made arrangement to have his teeth pulled and to proceed with the outpatient work-up which will include  - Cardiac Catherterization  - CTA  - Carotid Duplex  - Surgery Planned: Aortic Valve replacement - TAVR    Continue wearing the left Vest    I discussed the risks, benefits, and alternatives for TAVR surgery including the 4-5% chance of mortality, and 4-5% incidence of stroke.  We also discussed the risks of infection, bleeding, transfusion risks, postoperative arrhythmias, and postoperative recovery. The patient understood the risks and agrees to proceed with surgery.

## 2019-07-13 NOTE — PLAN OF CARE
Problem: Risk for Impaired Skin Integrity  Goal: Tissue integrity - skin and mucous membranes  Description  Structural intactness and normal physiological function of skin and  mucous membranes. Outcome: Ongoing  Note:   No new skin breakdown this shift. Patient is assisted with turning every two hours and as needed. Problem: Discharge Planning:  Goal: Discharged to appropriate level of care  Description  Discharged to appropriate level of care  Outcome: Ongoing  Note:   Planning to return home with significant other at discharge. Patient has lifevest ordered for discharge. Problem: Airway Clearance - Ineffective:  Goal: Clear lung sounds  Description  Clear lung sounds  Outcome: Ongoing  Note:   Patient remains on room air with no pulmonary complications. Problem: HEMODYNAMIC STATUS  Goal: Patient has stable vital signs and fluid balance  Outcome: Ongoing  Note:   VS stable, patient remains in NSR. Problem: ACTIVITY INTOLERANCE/IMPAIRED MOBILITY  Goal: Mobility/activity is maintained at optimum level for patient  Outcome: Ongoing  Note:   Patient experiences increased SOB with exertion. Patient educated on importance of pacing self with activity, and taking frequent breaks when SOB. Problem: Pain Control  Goal: Maintain pain level at or below patient's acceptable level (or 5 if patient is unable to determine acceptable level)  Outcome: Ongoing  Note:   No complaints of pain this shift. Pain goal 0, met with interventions including rest and repositioning. Problem: Cardiovascular  Goal: No DVT, peripheral vascular complications  Outcome: Ongoing  Note:   No signs of DVT this shift. Patient remains on SQ Heparin. Problem: Musculor/Skeletal Functional Status  Goal: Absence of falls  Outcome: Ongoing  Note:   No falls this shift. Call light in reach and used appropriately. Bed alarm remains on. Care plan reviewed with patient and significant other.  Patient and significant

## 2019-07-14 LAB
ANION GAP SERPL CALCULATED.3IONS-SCNC: 14 MEQ/L (ref 8–16)
BUN BLDV-MCNC: 43 MG/DL (ref 7–22)
CALCIUM SERPL-MCNC: 8.7 MG/DL (ref 8.5–10.5)
CHLORIDE BLD-SCNC: 103 MEQ/L (ref 98–111)
CO2: 19 MEQ/L (ref 23–33)
CREAT SERPL-MCNC: 1.2 MG/DL (ref 0.4–1.2)
FOLATE: > 20 NG/ML (ref 4.8–24.2)
GFR SERPL CREATININE-BSD FRML MDRD: 58 ML/MIN/1.73M2
GLUCOSE BLD-MCNC: 130 MG/DL (ref 70–108)
GLUCOSE BLD-MCNC: 132 MG/DL (ref 70–108)
GLUCOSE BLD-MCNC: 160 MG/DL (ref 70–108)
GLUCOSE BLD-MCNC: 227 MG/DL (ref 70–108)
GLUCOSE BLD-MCNC: 231 MG/DL (ref 70–108)
POTASSIUM SERPL-SCNC: 4.7 MEQ/L (ref 3.5–5.2)
SODIUM BLD-SCNC: 136 MEQ/L (ref 135–145)
VITAMIN B-12: 1478 PG/ML (ref 211–911)

## 2019-07-14 PROCEDURE — 87205 SMEAR GRAM STAIN: CPT

## 2019-07-14 PROCEDURE — 2060000000 HC ICU INTERMEDIATE R&B

## 2019-07-14 PROCEDURE — 36415 COLL VENOUS BLD VENIPUNCTURE: CPT

## 2019-07-14 PROCEDURE — 6360000002 HC RX W HCPCS: Performed by: NURSE PRACTITIONER

## 2019-07-14 PROCEDURE — 6370000000 HC RX 637 (ALT 250 FOR IP): Performed by: NURSE PRACTITIONER

## 2019-07-14 PROCEDURE — 82948 REAGENT STRIP/BLOOD GLUCOSE: CPT

## 2019-07-14 PROCEDURE — 82607 VITAMIN B-12: CPT

## 2019-07-14 PROCEDURE — 89050 BODY FLUID CELL COUNT: CPT

## 2019-07-14 PROCEDURE — 6370000000 HC RX 637 (ALT 250 FOR IP): Performed by: HOSPITALIST

## 2019-07-14 PROCEDURE — 80048 BASIC METABOLIC PNL TOTAL CA: CPT

## 2019-07-14 PROCEDURE — 99222 1ST HOSP IP/OBS MODERATE 55: CPT | Performed by: INTERNAL MEDICINE

## 2019-07-14 PROCEDURE — 82746 ASSAY OF FOLIC ACID SERUM: CPT

## 2019-07-14 PROCEDURE — 99232 SBSQ HOSP IP/OBS MODERATE 35: CPT | Performed by: HOSPITALIST

## 2019-07-14 PROCEDURE — 87116 MYCOBACTERIA CULTURE: CPT

## 2019-07-14 PROCEDURE — 94761 N-INVAS EAR/PLS OXIMETRY MLT: CPT

## 2019-07-14 PROCEDURE — 2580000003 HC RX 258: Performed by: NURSE PRACTITIONER

## 2019-07-14 PROCEDURE — 99232 SBSQ HOSP IP/OBS MODERATE 35: CPT | Performed by: INTERNAL MEDICINE

## 2019-07-14 RX ORDER — LORAZEPAM 0.5 MG/1
0.5 TABLET ORAL EVERY 6 HOURS PRN
Status: DISCONTINUED | OUTPATIENT
Start: 2019-07-14 | End: 2019-07-16

## 2019-07-14 RX ORDER — LORAZEPAM 2 MG/ML
0.5 INJECTION INTRAMUSCULAR EVERY 6 HOURS PRN
Status: DISCONTINUED | OUTPATIENT
Start: 2019-07-14 | End: 2019-07-14

## 2019-07-14 RX ORDER — FUROSEMIDE 20 MG/1
20 TABLET ORAL DAILY
Status: DISCONTINUED | OUTPATIENT
Start: 2019-07-14 | End: 2019-07-19 | Stop reason: HOSPADM

## 2019-07-14 RX ADMIN — DILTIAZEM HYDROCHLORIDE 240 MG: 240 CAPSULE, COATED, EXTENDED RELEASE ORAL at 08:36

## 2019-07-14 RX ADMIN — FUROSEMIDE 20 MG: 20 TABLET ORAL at 17:13

## 2019-07-14 RX ADMIN — Medication 10 ML: at 21:08

## 2019-07-14 RX ADMIN — LORAZEPAM 0.5 MG: 0.5 TABLET ORAL at 15:28

## 2019-07-14 RX ADMIN — HEPARIN SODIUM 5000 UNITS: 5000 INJECTION INTRAVENOUS; SUBCUTANEOUS at 15:28

## 2019-07-14 RX ADMIN — INSULIN LISPRO 2 UNITS: 100 INJECTION, SOLUTION INTRAVENOUS; SUBCUTANEOUS at 17:13

## 2019-07-14 RX ADMIN — INSULIN LISPRO 1 UNITS: 100 INJECTION, SOLUTION INTRAVENOUS; SUBCUTANEOUS at 21:08

## 2019-07-14 RX ADMIN — HEPARIN SODIUM 5000 UNITS: 5000 INJECTION INTRAVENOUS; SUBCUTANEOUS at 05:40

## 2019-07-14 RX ADMIN — SIMVASTATIN 20 MG: 20 TABLET, FILM COATED ORAL at 21:08

## 2019-07-14 RX ADMIN — Medication 10 ML: at 08:36

## 2019-07-14 RX ADMIN — HEPARIN SODIUM 5000 UNITS: 5000 INJECTION INTRAVENOUS; SUBCUTANEOUS at 21:08

## 2019-07-14 RX ADMIN — PANTOPRAZOLE SODIUM 40 MG: 40 TABLET, DELAYED RELEASE ORAL at 05:40

## 2019-07-14 RX ADMIN — CLOPIDOGREL 75 MG: 75 TABLET, FILM COATED ORAL at 08:36

## 2019-07-14 RX ADMIN — INSULIN LISPRO 1 UNITS: 100 INJECTION, SOLUTION INTRAVENOUS; SUBCUTANEOUS at 12:00

## 2019-07-14 ASSESSMENT — PAIN SCALES - GENERAL: PAINLEVEL_OUTOF10: 0

## 2019-07-14 NOTE — DISCHARGE INSTR - DIET
 Good nutrition is important when healing from an illness, injury, or surgery. Follow any nutrition recommendations given to you during your hospital stay.  If you were given an oral nutrition supplement while in the hospital, continue to take this supplement at home. You can take it with meals, in-between meals, and/or before bedtime. These supplements can be purchased at most local grocery stores, pharmacies, and chain super-stores.  If you have any questions about your diet or nutrition, call the hospital and ask for the dietitian. Low Sodium Diet    Sodium is a mineral found in table salt and sea salt. It is found in some foods naturally, and in additives or preservatives. Too much sodium intake can cause fluid retention and high blood pressure. Sodium intake should not exceed 2300 mg per day. Keep in mind that a teaspoon of salt is equal to 2300 mg of sodium. Salt and Sodium Savers       Use less sodium in cooking. Try citrus fruits, vinegar, black pepper, herbs, or spices to add flavor to foods because they are very low in sodium. When choosing flavorings and seasonings, make sure that your choices do not contain the words salt or sodium. For example, choose onion powder instead of onion salt, and do not choose seasoning salt. Use sodium free herb blend seasonings instead of seasonings that contain salt or sodium.  Add less sodium at the table. It may seem difficult at first, but try to use other seasonings such as pepper rather than salt at the table. Condiments and toppings such as ketchup, pickles, olives, soy sauce, and teriyaki sauce are high sodium condiments and should be used according to your specific sodium limits. Discuss this with your dietitian.  Ask your doctor before using a salt-substitute. Salt substitutes that look and taste like salt usually contain potassium.   Too much potassium can cause serious problems for people who have Even a small amount of weight loss may help improve your blood sugar control. To help lose weight, reduce your portion sizes. Control your intake of carbohydrates. Carbohydrate is the main  nutrient that affects blood sugar levels. All the carbohydrate you eat is turned  into sugar by your body. Therefore, it is important to control  the amount  of carbohydrate that you eat a day. You should eat about 60-75  grams of  carbohydrate at each meal.      Common sources of carbohydrates:                       Eat more fiber. Fiber can help slow down the rise in blood sugar following a meal.  To get more fiber in your diet, eat at least 5 servings of fruits and vegetables a day, choose whole grain bread/cereal and eat more beans or legumes. Reduce your intake of high fat foods. Cutting back on your intake of high fat food can help reduce body weight and cholesterol levels. Reduce intake of fried food, bell, sausage, luncheon meat, gravy, sour cream, cheese, egg yolks and margarine/butter. Limit your intake of alcohol. Drink alcohol only with permission of your doctor. Never drink alcohol on an empty stomach. Be more active. Regular exercise is an important part of your diabetes care as exercise can help lower your blood sugar levels. The type and amount of exercise that is right for you should be discussed with your doctor.

## 2019-07-14 NOTE — CONSULTS
buising  Skin: No itching. Meds    Current Medications    clopidogrel  75 mg Oral Daily    diltiazem  240 mg Oral Daily    simvastatin  20 mg Oral Nightly    pantoprazole  40 mg Oral QAM AC    insulin lispro  0-6 Units Subcutaneous TID WC    insulin lispro  0-3 Units Subcutaneous Nightly    sodium chloride flush  10 mL Intravenous 2 times per day    heparin (porcine)  5,000 Units Subcutaneous 3 times per day    ipratropium-albuterol  1 ampule Inhalation Q4H WA     glucose, dextrose, glucagon (rDNA), dextrose, LORazepam, sodium chloride flush, magnesium hydroxide, ondansetron  IV Drips/Infusions   dextrose       Vitals    Vitals    height is 5' 8\" (1.727 m) and weight is 190 lb 11.2 oz (86.5 kg). His oral temperature is 98.5 °F (36.9 °C). His blood pressure is 112/57 (abnormal) and his pulse is 81. His respiration is 18 and oxygen saturation is 92%. O2 Flow Rate (L/min): 1 L/min  I/O    Intake/Output Summary (Last 24 hours) at 7/14/2019 1227  Last data filed at 7/14/2019 0540  Gross per 24 hour   Intake 100 ml   Output 475 ml   Net -375 ml     Patient Vitals for the past 96 hrs (Last 3 readings):   Weight   07/14/19 0651 190 lb 11.2 oz (86.5 kg)   07/13/19 0348 194 lb 14.4 oz (88.4 kg)   07/12/19 0330 194 lb 6.4 oz (88.2 kg)     Exam   Constitutional: Patient appears chroncially ill   Head: Normocephalic and atraumatic. Mouth/Throat: Oropharynx is clear and moist.  No oral thrush. Eyes: Conjunctivae are normal. Pupils are equal, round, and reactive to light. No scleral icterus. Neck: Neck supple. No JVD or tracheal deviation present. Cardiovascular: Regular rate, regular rhythm, soft S1 and S2 with systlic murmur. No peripheral edema  Pulmonary/Chest: Decreased BS at bases Normal effort with clear breath sounds. No stridor. No respiratory distress. Abdominal: Soft. Bowel sounds audible.  No distension or tenderness to palp  Musculoskeletal: Moves all extremities, edema   Lymphadenopathy:  No was moderately dilated.   Right atrial size was moderately dilated.   The aortic valve was calcified, thickened, and had reduced mobility.   DOPPLER: Transaortic velocity was 4.6 m/s, mean 58 mmHg, MICHELLE 0.22 cm2.   There is severe, critical aortic stenosis.   There was moderate mitral regurgitation.   There was mild tricuspid regurgitation.   Assuming RAP = 15 mmHg, the estimated RVSP = 43 mmHg.   Mild pulmonic regurgitation.   Ascending aorta = 3.0 cm.  IVC size is dilated with reduced respiratory phasic changes   (CVP~10-15 mmHg)   Left pleural effusion. Cultures    Procalcitonin  Lab Results   Component Value Date    PROCAL 0.09 07/10/2019       Radiology    CXR    CT Scans 07/10/19          Assessment     Severe REESE, NYHA stage 4   Critical AS  Bilateral pleural effusion  CHF with low EF 30%  SEvere deconditioning secondary to above   Recommendations     Patient is being evaluated for TAVR, doesn't appear to have intrinsic lung disease, will do PFT as outpatient  Home O2 evaluation with walking and nocturnally   May benefit temporary from thoracentesis if staying in the hospital     Thank you for the consult and allowing us to participate in the care of your patient. Case discussed with nurse and patient/family. Questions and concerns addressed. Meds and Orders reviewed.     Electronically signed by     Alessandro Briscoe MD on 7/14/2019 at 12:27 PM

## 2019-07-15 ENCOUNTER — APPOINTMENT (OUTPATIENT)
Dept: GENERAL RADIOLOGY | Age: 84
DRG: 291 | End: 2019-07-15
Payer: MEDICARE

## 2019-07-15 LAB
ANION GAP SERPL CALCULATED.3IONS-SCNC: 14 MEQ/L (ref 8–16)
BLOOD CULTURE, ROUTINE: NORMAL
BLOOD CULTURE, ROUTINE: NORMAL
BUN BLDV-MCNC: 50 MG/DL (ref 7–22)
CALCIUM SERPL-MCNC: 8.5 MG/DL (ref 8.5–10.5)
CHLORIDE BLD-SCNC: 101 MEQ/L (ref 98–111)
CO2: 17 MEQ/L (ref 23–33)
CREAT SERPL-MCNC: 1.4 MG/DL (ref 0.4–1.2)
GFR SERPL CREATININE-BSD FRML MDRD: 48 ML/MIN/1.73M2
GLUCOSE BLD-MCNC: 145 MG/DL (ref 70–108)
GLUCOSE BLD-MCNC: 171 MG/DL (ref 70–108)
GLUCOSE BLD-MCNC: 183 MG/DL (ref 70–108)
GLUCOSE BLD-MCNC: 226 MG/DL (ref 70–108)
GLUCOSE BLD-MCNC: 253 MG/DL (ref 70–108)
INR BLD: 1.53 (ref 0.85–1.13)
POTASSIUM SERPL-SCNC: 4.6 MEQ/L (ref 3.5–5.2)
SODIUM BLD-SCNC: 132 MEQ/L (ref 135–145)

## 2019-07-15 PROCEDURE — 36415 COLL VENOUS BLD VENIPUNCTURE: CPT

## 2019-07-15 PROCEDURE — 2580000003 HC RX 258: Performed by: NURSE PRACTITIONER

## 2019-07-15 PROCEDURE — 6370000000 HC RX 637 (ALT 250 FOR IP): Performed by: HOSPITALIST

## 2019-07-15 PROCEDURE — 85610 PROTHROMBIN TIME: CPT

## 2019-07-15 PROCEDURE — 80048 BASIC METABOLIC PNL TOTAL CA: CPT

## 2019-07-15 PROCEDURE — 99232 SBSQ HOSP IP/OBS MODERATE 35: CPT | Performed by: NURSE PRACTITIONER

## 2019-07-15 PROCEDURE — 99232 SBSQ HOSP IP/OBS MODERATE 35: CPT | Performed by: INTERNAL MEDICINE

## 2019-07-15 PROCEDURE — 2060000000 HC ICU INTERMEDIATE R&B

## 2019-07-15 PROCEDURE — APPSS30 APP SPLIT SHARED TIME 16-30 MINUTES: Performed by: NURSE PRACTITIONER

## 2019-07-15 PROCEDURE — 6370000000 HC RX 637 (ALT 250 FOR IP): Performed by: NURSE PRACTITIONER

## 2019-07-15 PROCEDURE — 6370000000 HC RX 637 (ALT 250 FOR IP): Performed by: INTERNAL MEDICINE

## 2019-07-15 PROCEDURE — 71046 X-RAY EXAM CHEST 2 VIEWS: CPT

## 2019-07-15 PROCEDURE — 82948 REAGENT STRIP/BLOOD GLUCOSE: CPT

## 2019-07-15 RX ORDER — SODIUM BICARBONATE 650 MG/1
650 TABLET ORAL 2 TIMES DAILY
Status: DISCONTINUED | OUTPATIENT
Start: 2019-07-15 | End: 2019-07-18

## 2019-07-15 RX ORDER — FAMOTIDINE 20 MG/1
20 TABLET, FILM COATED ORAL DAILY
Status: DISCONTINUED | OUTPATIENT
Start: 2019-07-15 | End: 2019-07-19 | Stop reason: HOSPADM

## 2019-07-15 RX ADMIN — FAMOTIDINE 20 MG: 20 TABLET ORAL at 09:03

## 2019-07-15 RX ADMIN — LORAZEPAM 0.5 MG: 0.5 TABLET ORAL at 21:11

## 2019-07-15 RX ADMIN — PANTOPRAZOLE SODIUM 40 MG: 40 TABLET, DELAYED RELEASE ORAL at 05:04

## 2019-07-15 RX ADMIN — INSULIN LISPRO 3 UNITS: 100 INJECTION, SOLUTION INTRAVENOUS; SUBCUTANEOUS at 17:49

## 2019-07-15 RX ADMIN — LORAZEPAM 0.5 MG: 0.5 TABLET ORAL at 00:37

## 2019-07-15 RX ADMIN — DILTIAZEM HYDROCHLORIDE 240 MG: 240 CAPSULE, COATED, EXTENDED RELEASE ORAL at 09:03

## 2019-07-15 RX ADMIN — SODIUM BICARBONATE 650 MG: 650 TABLET ORAL at 21:04

## 2019-07-15 RX ADMIN — SODIUM BICARBONATE 650 MG: 650 TABLET ORAL at 17:50

## 2019-07-15 RX ADMIN — Medication 10 ML: at 09:05

## 2019-07-15 RX ADMIN — INSULIN LISPRO 1 UNITS: 100 INJECTION, SOLUTION INTRAVENOUS; SUBCUTANEOUS at 21:04

## 2019-07-15 RX ADMIN — FUROSEMIDE 20 MG: 20 TABLET ORAL at 09:04

## 2019-07-15 RX ADMIN — SIMVASTATIN 20 MG: 20 TABLET, FILM COATED ORAL at 21:04

## 2019-07-15 RX ADMIN — Medication 10 ML: at 21:04

## 2019-07-15 NOTE — PLAN OF CARE
Problem: Risk for Impaired Skin Integrity  Goal: Tissue integrity - skin and mucous membranes  Description  Structural intactness and normal physiological function of skin and  mucous membranes. Outcome: Ongoing  Note:   No new signs of skin breakdown this shift. Refuses to turn. Problem: Discharge Planning:  Goal: Discharged to appropriate level of care  Description  Discharged to appropriate level of care  Outcome: Ongoing  Note:   Plans to discharge home with significant other and home health. Problem: Discharge Planning:  Goal: Participates in care planning  Description  Participates in care planning  Outcome: Ongoing  Note:   Participates in plan of care. Problem: Airway Clearance - Ineffective:  Goal: Clear lung sounds  Description  Clear lung sounds  Outcome: Ongoing  Note:   Lung sounds clear and diminished. Problem: Fluid Volume - Deficit:  Goal: Achieves intake and output within specified parameters  Description  Achieves intake and output within specified parameters  Outcome: Ongoing  Note:   Intake and output adequate so far this shift. Problem: OXYGENATION/RESPIRATORY FUNCTION  Goal: Patient will maintain patent airway  Outcome: Ongoing  Note:   Airway patent, remains on 1L NC for comfort. Problem: OXYGENATION/RESPIRATORY FUNCTION  Goal: Patient will achieve/maintain normal respiratory rate/effort  Description  Respiratory rate and effort will be within normal limits for the patient  Outcome: Ongoing  Note:   RR WNL at rest. Dypsnea with exertion. Problem: HEMODYNAMIC STATUS  Goal: Patient has stable vital signs and fluid balance  Outcome: Ongoing  Note:   BP and HR stable. Problem: ACTIVITY INTOLERANCE/IMPAIRED MOBILITY  Goal: Mobility/activity is maintained at optimum level for patient  Outcome: Ongoing  Note:   Ambulates 1 assist with walker.       Problem: Pain Control  Goal: Maintain pain level at or below patient's acceptable level (or 5 if patient is unable

## 2019-07-15 NOTE — PROGRESS NOTES
Hospitalist Progress Note    Patient:  Melly Mcdaniel      Unit/Bed:4K-10/010-A    YOB: 1935    MRN: [id number]       Acct: [de-identified]     PCP: Woody Landers MD    Date of Admission: 7/10/2019    Assessment/Plan:    1. Acute on chronic Systolic HF--has been off BB due to BP issues; ProBNP 49,742 on admit; echo from 5/2019 with EF 25-30%; ahead 2.1L; weight up 7# since 7/14; no BB/ACE-I/ARB 2nd to hypotension; LifeVest on   2. Bilateral Pleural Effusions--per pulmonary; no thora today 2nd to INR; planning tomorrow; on RA; on Lasix 20 mg PO BID (started today)  3. Possible pneumonia, bilateral (POA)--normal PCT so doubt pneumonia; Levaquin 7/10-7/12; afebrile; on RA  4. Elevated troponin--appreciate cardiology input; on Plavix, statin  5. CHERI on CKD Stage 3--appreciate nephrology input; better  6. Metabolic acidosis--improved  7. Lactic acidosis--resolved   8. Hyperkalemia--resolved   9. Severe aortic stenosis--offered TAVR, patient considering; apprecitate Dr Jade Bradford input~planning [address] [place] [address] when [facility] with nephrology (when creatinine <1.5)  10. DM-2, controlled--on SSI #1; monitor   11. Leukocytosis--trending down; afebrile   12. Elevated LFT's--hepatitis panel normal; U/S liver/gallbladder noted;  HIDA scan (-)  13. Cholelithiasis--see #11  14. Macrocytic, hyperchromic anemia--stable  15.  Right CEA--on statin, Plavix      Expected discharge date:  TBD    Disposition:    [x] Home       [] TCU       [] Rehab       [] Psych       [] SNF       [] [place]       [] Other-    Chief Complaint: shortness of breath    Hospital Course: 82 y.o. male who presented to [address] [place] [address] with shortness of breath; reports chronically being dyspneic due to aortic valvular issues as was diagnosed with severe AS; offered TAVR by Dr. Jade Bradford; chart review notes compliance issues with that process; he is again considering; has been progressively worsening to the point he cant lay flat, take care of

## 2019-07-15 NOTE — PROGRESS NOTES
results for input(s): TROPONINI in the last 72 hours. BNP: No results for input(s): BNP in the last 72 hours. INR:   Recent Labs     07/15/19  0855   INR 1.53*     Lipids: No results for input(s): CHOL, LDLDIRECT, TRIG, HDL, AMYLASE, LIPASE in the last 72 hours. Liver:   No results for input(s): AST, ALT, ALKPHOS, PROT, LABALBU, BILITOT in the last 72 hours. Invalid input(s): BILDIR  Iron:  No results for input(s): IRONS, FERRITIN in the last 72 hours. Invalid input(s): LABIRONS    Objective:   Vitals: /67   Pulse 79   Temp 98.4 °F (36.9 °C) (Oral)   Resp 19   Ht 5' 8\" (1.727 m)   Wt 197 lb 1.6 oz (89.4 kg) Comment: life vest on  SpO2 95%   BMI 29.97 kg/m²    Wt Readings from Last 3 Encounters:   07/15/19 197 lb 1.6 oz (89.4 kg)   05/15/19 192 lb 9.6 oz (87.4 kg)   05/15/19 192 lb (87.1 kg)      24HR INTAKE/OUTPUT:      Intake/Output Summary (Last 24 hours) at 7/15/2019 1341  Last data filed at 7/15/2019 1335  Gross per 24 hour   Intake 660 ml   Output 625 ml   Net 35 ml       Constitutional: Sleeping comfortably  Skin:normal   HEENT:Pupils are reactive . Throat is clear   Neck:supple with no thyromegally  Cardiovascular:  S1, S2 without murmur  Respiratory: Decreased breath sound. Presence of LifeVest is noted. Abdomen: +bs, soft.   Ext: 2+ LE edema  Musculoskeletal:Intact  Neuro: Deferred      Electronically signed by Alyson Harden MD on 7/15/2019 at 1:41 PM

## 2019-07-15 NOTE — PROGRESS NOTES
[facility]    Patient - Yajaira Queen   MRN -  [id number]   [facility]   - 1935      Date of Admission -  7/10/2019  4:04 AM  Date of evaluation -  7/15/2019  Room - -10/010-A   Hospital Day - 5  Consulting - TAMAR Sanchez * Primary Care Physician - Doyle Arvizu MD   Chief Complaint   Shortness of breath  Active Hospital Problem List      Active Hospital Problems    Diagnosis Date Noted    Critical aortic valve stenosis [I35.0]      Priority: High    Acute renal failure (HCC) [N17.9]      Priority: High    Nonrheumatic aortic valve stenosis [I35.0]      Priority: High    Bilateral pleural effusion [J90]     CHERI (acute kidney injury) ([address] [N17.9]     Acidosis [E87.2]     Acute on chronic systolic heart failure (HCC) [I50.23]     Pneumonia due to organism [J18.9] 07/10/2019     HPI   Yajaira Queen is a 82 y.o. male with past medical history of AS, CKD stage III, DM2, CHF EF 25-30%. Admitted 07/10/19 for worsening SOB. No cough or sputum production. CXR showed bilateral pleural effusion. Ct abdomen showed similar findings. He didn't tolerate diuresis due to Hypotension. He appears weak and deconditioned. He only smoked for few years. He worked with computers. No history of Asthma or COPD, or VINCENT. AB.40/29/77/18    Past 24 hrs    -On RA  -Reports SOB improved  -US thoracentesis on hold INR 1.53  All other systems reviewed    Past Medical History         Diagnosis Date    Abnormality of aortic valve     Cancer ([address]     Right eye    Carotid artery narrowing     Diabetes mellitus ([address]     Hypertension       Past Surgical History           Procedure Laterality Date    VASCULAR SURGERY       Diet    Dietary Nutrition Supplements: Diabetic Oral Supplement  DIET RENAL; Carb Control: 5 carb choices (75 gms)/meal; Low Sodium (2 GM)  Allergies    Patient has no known allergies.   Social History     Social History     Socioeconomic biliary dilatation. Spleen: There are a few calcified granulomas. No splenomegaly. Pancreas: Unremarkable. No mass or pancreatic ductal dilatation. Adrenal glands: Unremarkable. No mass. Kidneys and ureters: There is a 16 x 9 mm nonobstructing mid left renal calculus and there is an 8 x 8 mm nonobstructing left upper pole renal calculus. There is a 8.1 x 6.3 cm cyst of the posterior upper/mid left kidney. There is no hydroureteronephrosis. Stomach, small bowel, and colon: Stomach and duodenum are unremarkable. There is colonic diverticulosis without diverticulitis. There is moderate stool throughout the colon. Small bowel and colon are otherwise unremarkable. There is no evidence of bowel wall thickening. No evidence of bowel obstruction. Appendix: Unremarkable. No findings to suggest acute appendicitis. Omentum and mesentery: Unremarkable   Aorta, vascular: No aortic aneurysm or dissection. There is a moderate stenosis of the proximal left common iliac artery. Reproductive: There is nonspecific enlargement of the prostate gland, correlate clinically. Bladder: The wall of the urinary bladder is thickened, most likely due to a combination of incomplete distension and partial outflow obstruction due to the enlarged prostate. No calcified stones. Intraperitoneal/retroperitoneal Space: There is no ascites, abscess, adenopathy, or mass. No pneumoperitoneum. Abdominal and pelvic body wall soft tissues: There are surgical clips in the right groin. There is a fat-containing left inguinal hernia. Musculoskeletal structures: There are degenerative changes of the lumbar spine. There is 3 to 4 mm anterolisthesis of L4 on L5. Impression:  No acute inflammatory or infectious process in the abdomen or pelvis. Colonic diverticulosis without diverticulitis. Moderate stool throughout the colon. Moderate bilateral pleural effusions with bilateral lower lobe atelectasis and possible mild pulmonary edema.

## 2019-07-16 ENCOUNTER — APPOINTMENT (OUTPATIENT)
Dept: ULTRASOUND IMAGING | Age: 84
DRG: 291 | End: 2019-07-16
Payer: MEDICARE

## 2019-07-16 ENCOUNTER — APPOINTMENT (OUTPATIENT)
Dept: GENERAL RADIOLOGY | Age: 84
DRG: 291 | End: 2019-07-16
Payer: MEDICARE

## 2019-07-16 LAB
ANION GAP SERPL CALCULATED.3IONS-SCNC: 17 MEQ/L (ref 8–16)
BODY FLUID RBC: 8000 /CUMM
BUN BLDV-MCNC: 58 MG/DL (ref 7–22)
CALCIUM SERPL-MCNC: 8.7 MG/DL (ref 8.5–10.5)
CHARACTER, BODY FLUID: ABNORMAL
CHLORIDE BLD-SCNC: 99 MEQ/L (ref 98–111)
CO2: 18 MEQ/L (ref 23–33)
COLOR: ABNORMAL
CREAT SERPL-MCNC: 1.2 MG/DL (ref 0.4–1.2)
ERYTHROCYTE [DISTWIDTH] IN BLOOD BY AUTOMATED COUNT: 16.2 % (ref 11.5–14.5)
ERYTHROCYTE [DISTWIDTH] IN BLOOD BY AUTOMATED COUNT: 57.5 FL (ref 35–45)
GFR SERPL CREATININE-BSD FRML MDRD: 58 ML/MIN/1.73M2
GLUCOSE BLD-MCNC: 157 MG/DL (ref 70–108)
GLUCOSE BLD-MCNC: 158 MG/DL (ref 70–108)
GLUCOSE BLD-MCNC: 165 MG/DL (ref 70–108)
GLUCOSE BLD-MCNC: 169 MG/DL (ref 70–108)
GLUCOSE BLD-MCNC: 205 MG/DL (ref 70–108)
GLUCOSE BLD-MCNC: 206 MG/DL (ref 70–108)
GLUCOSE, FLUID: 159 MG/DL
HCT VFR BLD CALC: 32.6 % (ref 42–52)
HEMOGLOBIN: 10.7 GM/DL (ref 14–18)
INR BLD: 1.55 (ref 0.85–1.13)
INTERPRETATION: NORMAL
LD, FLUID: 147 U/L
MCH RBC QN AUTO: 33 PG (ref 26–33)
MCHC RBC AUTO-ENTMCNC: 32.8 GM/DL (ref 32.2–35.5)
MCV RBC AUTO: 100.6 FL (ref 80–94)
MESOTHELIAL CELLS BODY FLUID: ABNORMAL
MONONUCLEAR CELLS BODY FLUID: 12.5 %
PATHOLOGIST REVIEW: ABNORMAL
PH FLUID: 7.52
PLATELET # BLD: 248 THOU/MM3 (ref 130–400)
PMV BLD AUTO: 11.5 FL (ref 9.4–12.4)
POLYMORPHONUCLEAR CELLS BODY FLUID: 87.5 %
POTASSIUM SERPL-SCNC: 4.8 MEQ/L (ref 3.5–5.2)
PROTEIN FLUID: 1.5 GM/DL
RBC # BLD: 3.24 MILL/MM3 (ref 4.7–6.1)
SODIUM BLD-SCNC: 134 MEQ/L (ref 135–145)
SPECIMEN: ABNORMAL
TOTAL NUCLEATED CELLS BODY FLUID: 1961 /CUMM (ref 0–500)
TOTAL VOLUME RECEIVED BODY FLUID: 83 ML
WBC # BLD: 19.6 THOU/MM3 (ref 4.8–10.8)

## 2019-07-16 PROCEDURE — 71045 X-RAY EXAM CHEST 1 VIEW: CPT

## 2019-07-16 PROCEDURE — 2580000003 HC RX 258: Performed by: NURSE PRACTITIONER

## 2019-07-16 PROCEDURE — 83615 LACTATE (LD) (LDH) ENZYME: CPT

## 2019-07-16 PROCEDURE — 80048 BASIC METABOLIC PNL TOTAL CA: CPT

## 2019-07-16 PROCEDURE — 88305 TISSUE EXAM BY PATHOLOGIST: CPT

## 2019-07-16 PROCEDURE — 88112 CYTOPATH CELL ENHANCE TECH: CPT

## 2019-07-16 PROCEDURE — APPSS30 APP SPLIT SHARED TIME 16-30 MINUTES: Performed by: NURSE PRACTITIONER

## 2019-07-16 PROCEDURE — 0W993ZZ DRAINAGE OF RIGHT PLEURAL CAVITY, PERCUTANEOUS APPROACH: ICD-10-PCS | Performed by: RADIOLOGY

## 2019-07-16 PROCEDURE — 99232 SBSQ HOSP IP/OBS MODERATE 35: CPT | Performed by: INTERNAL MEDICINE

## 2019-07-16 PROCEDURE — 85610 PROTHROMBIN TIME: CPT

## 2019-07-16 PROCEDURE — 6370000000 HC RX 637 (ALT 250 FOR IP): Performed by: INTERNAL MEDICINE

## 2019-07-16 PROCEDURE — 87070 CULTURE OTHR SPECIMN AEROBIC: CPT

## 2019-07-16 PROCEDURE — 82948 REAGENT STRIP/BLOOD GLUCOSE: CPT

## 2019-07-16 PROCEDURE — 85027 COMPLETE CBC AUTOMATED: CPT

## 2019-07-16 PROCEDURE — 6370000000 HC RX 637 (ALT 250 FOR IP): Performed by: HOSPITALIST

## 2019-07-16 PROCEDURE — 87102 FUNGUS ISOLATION CULTURE: CPT

## 2019-07-16 PROCEDURE — 99232 SBSQ HOSP IP/OBS MODERATE 35: CPT | Performed by: NURSE PRACTITIONER

## 2019-07-16 PROCEDURE — 6360000002 HC RX W HCPCS: Performed by: NURSE PRACTITIONER

## 2019-07-16 PROCEDURE — 2060000000 HC ICU INTERMEDIATE R&B

## 2019-07-16 PROCEDURE — 6370000000 HC RX 637 (ALT 250 FOR IP): Performed by: NURSE PRACTITIONER

## 2019-07-16 PROCEDURE — 36415 COLL VENOUS BLD VENIPUNCTURE: CPT

## 2019-07-16 PROCEDURE — 83986 ASSAY PH BODY FLUID NOS: CPT

## 2019-07-16 PROCEDURE — 32555 ASPIRATE PLEURA W/ IMAGING: CPT

## 2019-07-16 PROCEDURE — 82945 GLUCOSE OTHER FLUID: CPT

## 2019-07-16 PROCEDURE — 84157 ASSAY OF PROTEIN OTHER: CPT

## 2019-07-16 PROCEDURE — 87075 CULTR BACTERIA EXCEPT BLOOD: CPT

## 2019-07-16 PROCEDURE — 87205 SMEAR GRAM STAIN: CPT

## 2019-07-16 RX ORDER — ACETAMINOPHEN 325 MG/1
650 TABLET ORAL EVERY 4 HOURS PRN
Status: DISCONTINUED | OUTPATIENT
Start: 2019-07-16 | End: 2019-07-19 | Stop reason: HOSPADM

## 2019-07-16 RX ORDER — LANOLIN ALCOHOL/MO/W.PET/CERES
3 CREAM (GRAM) TOPICAL NIGHTLY PRN
Status: DISCONTINUED | OUTPATIENT
Start: 2019-07-16 | End: 2019-07-19 | Stop reason: HOSPADM

## 2019-07-16 RX ADMIN — INSULIN LISPRO 1 UNITS: 100 INJECTION, SOLUTION INTRAVENOUS; SUBCUTANEOUS at 21:03

## 2019-07-16 RX ADMIN — INSULIN LISPRO 2 UNITS: 100 INJECTION, SOLUTION INTRAVENOUS; SUBCUTANEOUS at 17:13

## 2019-07-16 RX ADMIN — INSULIN LISPRO 1 UNITS: 100 INJECTION, SOLUTION INTRAVENOUS; SUBCUTANEOUS at 13:07

## 2019-07-16 RX ADMIN — SODIUM BICARBONATE 650 MG: 650 TABLET ORAL at 08:24

## 2019-07-16 RX ADMIN — SODIUM BICARBONATE 650 MG: 650 TABLET ORAL at 21:03

## 2019-07-16 RX ADMIN — Medication 10 ML: at 08:27

## 2019-07-16 RX ADMIN — FUROSEMIDE 20 MG: 20 TABLET ORAL at 08:24

## 2019-07-16 RX ADMIN — SIMVASTATIN 20 MG: 20 TABLET, FILM COATED ORAL at 21:03

## 2019-07-16 RX ADMIN — INSULIN LISPRO 1 UNITS: 100 INJECTION, SOLUTION INTRAVENOUS; SUBCUTANEOUS at 08:24

## 2019-07-16 RX ADMIN — LORAZEPAM 0.5 MG: 0.5 TABLET ORAL at 03:33

## 2019-07-16 RX ADMIN — ACETAMINOPHEN 650 MG: 325 TABLET ORAL at 21:31

## 2019-07-16 RX ADMIN — HEPARIN SODIUM 5000 UNITS: 5000 INJECTION INTRAVENOUS; SUBCUTANEOUS at 21:02

## 2019-07-16 RX ADMIN — FAMOTIDINE 20 MG: 20 TABLET ORAL at 08:24

## 2019-07-16 RX ADMIN — Medication 10 ML: at 21:03

## 2019-07-16 ASSESSMENT — PAIN DESCRIPTION - PAIN TYPE: TYPE: ACUTE PAIN

## 2019-07-16 ASSESSMENT — PAIN SCALES - GENERAL
PAINLEVEL_OUTOF10: 8
PAINLEVEL_OUTOF10: 0
PAINLEVEL_OUTOF10: 0

## 2019-07-16 ASSESSMENT — PAIN - FUNCTIONAL ASSESSMENT: PAIN_FUNCTIONAL_ASSESSMENT: ACTIVITIES ARE NOT PREVENTED

## 2019-07-16 ASSESSMENT — PAIN DESCRIPTION - ORIENTATION: ORIENTATION: RIGHT

## 2019-07-16 ASSESSMENT — PAIN DESCRIPTION - PROGRESSION: CLINICAL_PROGRESSION: GRADUALLY IMPROVING

## 2019-07-16 ASSESSMENT — PAIN DESCRIPTION - ONSET: ONSET: OTHER (COMMENT)

## 2019-07-16 ASSESSMENT — PAIN DESCRIPTION - FREQUENCY: FREQUENCY: INTERMITTENT

## 2019-07-16 ASSESSMENT — PAIN DESCRIPTION - LOCATION: LOCATION: BACK

## 2019-07-16 ASSESSMENT — PAIN DESCRIPTION - DESCRIPTORS: DESCRIPTORS: SHARP

## 2019-07-16 NOTE — BRIEF OP NOTE
Post Procedure Progress Note      7/16/2019  Pre-Procedure Diagnosis: Right pleural effusion  Post-Procedure Diagnosis: Same  Physician: Camilo Villanueva MD  Anesthesia: 2% lidocaine local  Procedure Performed: Ultrasound guided right thoracentesis  Specimen Removed: 1 liter cloudy dark yellow pleural fluid  Disposition of Specimen: 83 ml specimen sent to the lab  Estimated Blood Loss: None  Complications: None

## 2019-07-16 NOTE — PROGRESS NOTES
follows simple commands. Skin: Warm and dry. Labs   ABG  Lab Results   Component Value Date    PH 7.40 07/13/2019    PO2 77 07/13/2019    PCO2 29 07/13/2019    HCO3 18 07/13/2019    O2SAT 95 07/13/2019     Lab Results   Component Value Date    IFIO2 21 07/13/2019     CBC  Recent Labs     07/16/19  0526   WBC 19.6*   RBC 3.24*   HGB 10.7*   HCT 32.6*   .6*   MCH 33.0   MCHC 32.8      MPV 11.5      BMP  Recent Labs     07/14/19  0526 07/15/19  0532 07/16/19  0526    132* 134*   K 4.7 4.6 4.8    101 99   CO2 19* 17* 18*   BUN 43* 50* 58*   CREATININE 1.2 1.4* 1.2   GLUCOSE 130* 171* 169*   CALCIUM 8.7 8.5 8.7     LFT  No results for input(s): AST, ALT, ALB, BILITOT, ALKPHOS, LIPASE in the last 72 hours. Invalid input(s): AMYLASE  TROP  Lab Results   Component Value Date    TROPONINT 0.062 07/10/2019    TROPONINT 0.061 07/10/2019    TROPONINT 0.069 07/10/2019     BNP  Lab Results   Component Value Date    PROBNP 52308.0 07/10/2019     D-Dimer  No results found for: DDIMER  Lactic Acid  No results for input(s): LACTA in the last 72 hours. INR  Recent Labs     07/15/19  0855 07/16/19  0526   INR 1.53* 1.55*     PTT  No results for input(s): APTT in the last 72 hours. Glucose  Recent Labs     07/15/19  2100 07/16/19  0651 07/16/19  1059   POCGLU 226* 158* 165*     UA No results for input(s): SPECGRAV, PHUR, COLORU, CLARITYU, MUCUS, PROTEINU, BLOODU, RBCUA, WBCUA, BACTERIA, NITRU, GLUCOSEU, BILIRUBINUR, UROBILINOGEN, KETUA, LABCAST, LABCASTTY, AMORPHOS in the last 72 hours. Invalid input(s): CRYSTALS.   PFTs   No records   Echo     Summary   Ejection fraction was estimated at 25-30%.   There was severe global hypokinesis.   Doppler parameters were consistent with a reversible restrictive pattern,   indicative of decreased left ventricular diastolic compliance and/or   increased left atrial pressure (grade 3 diastolic dysfunction).   Left atrial size was moderately dilated.   Right

## 2019-07-17 LAB
ALBUMIN SERPL-MCNC: 2.4 G/DL (ref 3.5–5.1)
ALP BLD-CCNC: 105 U/L (ref 38–126)
ALT SERPL-CCNC: 232 U/L (ref 11–66)
ANION GAP SERPL CALCULATED.3IONS-SCNC: 13 MEQ/L (ref 8–16)
AST SERPL-CCNC: 131 U/L (ref 5–40)
BILIRUB SERPL-MCNC: 1.3 MG/DL (ref 0.3–1.2)
BILIRUBIN DIRECT: 0.8 MG/DL (ref 0–0.3)
BUN BLDV-MCNC: 54 MG/DL (ref 7–22)
CALCIUM SERPL-MCNC: 8.4 MG/DL (ref 8.5–10.5)
CHLORIDE BLD-SCNC: 100 MEQ/L (ref 98–111)
CO2: 20 MEQ/L (ref 23–33)
CREAT SERPL-MCNC: 1.2 MG/DL (ref 0.4–1.2)
ERYTHROCYTE [DISTWIDTH] IN BLOOD BY AUTOMATED COUNT: 15.9 % (ref 11.5–14.5)
ERYTHROCYTE [DISTWIDTH] IN BLOOD BY AUTOMATED COUNT: 57 FL (ref 35–45)
GFR SERPL CREATININE-BSD FRML MDRD: 58 ML/MIN/1.73M2
GLUCOSE BLD-MCNC: 145 MG/DL (ref 70–108)
GLUCOSE BLD-MCNC: 147 MG/DL (ref 70–108)
GLUCOSE BLD-MCNC: 152 MG/DL (ref 70–108)
GLUCOSE BLD-MCNC: 159 MG/DL (ref 70–108)
GLUCOSE BLD-MCNC: 241 MG/DL (ref 70–108)
HCT VFR BLD CALC: 34.6 % (ref 42–52)
HEMOGLOBIN: 10.9 GM/DL (ref 14–18)
LD: 246 U/L (ref 100–190)
MCH RBC QN AUTO: 32.5 PG (ref 26–33)
MCHC RBC AUTO-ENTMCNC: 31.5 GM/DL (ref 32.2–35.5)
MCV RBC AUTO: 103.3 FL (ref 80–94)
PLATELET # BLD: 233 THOU/MM3 (ref 130–400)
PMV BLD AUTO: 11.5 FL (ref 9.4–12.4)
POTASSIUM SERPL-SCNC: 4.2 MEQ/L (ref 3.5–5.2)
RBC # BLD: 3.35 MILL/MM3 (ref 4.7–6.1)
SODIUM BLD-SCNC: 133 MEQ/L (ref 135–145)
TOTAL PROTEIN: 6.6 G/DL (ref 6.1–8)
WBC # BLD: 15.2 THOU/MM3 (ref 4.8–10.8)

## 2019-07-17 PROCEDURE — 36415 COLL VENOUS BLD VENIPUNCTURE: CPT

## 2019-07-17 PROCEDURE — 99232 SBSQ HOSP IP/OBS MODERATE 35: CPT | Performed by: INTERNAL MEDICINE

## 2019-07-17 PROCEDURE — 6370000000 HC RX 637 (ALT 250 FOR IP): Performed by: HOSPITALIST

## 2019-07-17 PROCEDURE — 2060000000 HC ICU INTERMEDIATE R&B

## 2019-07-17 PROCEDURE — 82948 REAGENT STRIP/BLOOD GLUCOSE: CPT

## 2019-07-17 PROCEDURE — APPSS30 APP SPLIT SHARED TIME 16-30 MINUTES: Performed by: NURSE PRACTITIONER

## 2019-07-17 PROCEDURE — 6370000000 HC RX 637 (ALT 250 FOR IP): Performed by: NURSE PRACTITIONER

## 2019-07-17 PROCEDURE — 6360000002 HC RX W HCPCS: Performed by: NURSE PRACTITIONER

## 2019-07-17 PROCEDURE — 82248 BILIRUBIN DIRECT: CPT

## 2019-07-17 PROCEDURE — 2580000003 HC RX 258: Performed by: NURSE PRACTITIONER

## 2019-07-17 PROCEDURE — 85027 COMPLETE CBC AUTOMATED: CPT

## 2019-07-17 PROCEDURE — 83615 LACTATE (LD) (LDH) ENZYME: CPT

## 2019-07-17 PROCEDURE — 2709999900 HC NON-CHARGEABLE SUPPLY

## 2019-07-17 PROCEDURE — 6370000000 HC RX 637 (ALT 250 FOR IP): Performed by: INTERNAL MEDICINE

## 2019-07-17 PROCEDURE — 80053 COMPREHEN METABOLIC PANEL: CPT

## 2019-07-17 RX ADMIN — SIMVASTATIN 20 MG: 20 TABLET, FILM COATED ORAL at 21:05

## 2019-07-17 RX ADMIN — Medication 10 ML: at 21:09

## 2019-07-17 RX ADMIN — FUROSEMIDE 20 MG: 20 TABLET ORAL at 08:55

## 2019-07-17 RX ADMIN — ACETAMINOPHEN 650 MG: 325 TABLET ORAL at 00:58

## 2019-07-17 RX ADMIN — INSULIN LISPRO 2 UNITS: 100 INJECTION, SOLUTION INTRAVENOUS; SUBCUTANEOUS at 13:29

## 2019-07-17 RX ADMIN — HEPARIN SODIUM 5000 UNITS: 5000 INJECTION INTRAVENOUS; SUBCUTANEOUS at 21:06

## 2019-07-17 RX ADMIN — SODIUM BICARBONATE 650 MG: 650 TABLET ORAL at 21:05

## 2019-07-17 RX ADMIN — SODIUM BICARBONATE 650 MG: 650 TABLET ORAL at 08:55

## 2019-07-17 RX ADMIN — Medication 10 ML: at 08:56

## 2019-07-17 RX ADMIN — HEPARIN SODIUM 5000 UNITS: 5000 INJECTION INTRAVENOUS; SUBCUTANEOUS at 13:29

## 2019-07-17 RX ADMIN — Medication 3 MG: at 23:55

## 2019-07-17 RX ADMIN — CLOPIDOGREL 75 MG: 75 TABLET, FILM COATED ORAL at 08:55

## 2019-07-17 RX ADMIN — INSULIN LISPRO 1 UNITS: 100 INJECTION, SOLUTION INTRAVENOUS; SUBCUTANEOUS at 08:56

## 2019-07-17 RX ADMIN — HEPARIN SODIUM 5000 UNITS: 5000 INJECTION INTRAVENOUS; SUBCUTANEOUS at 05:37

## 2019-07-17 RX ADMIN — INSULIN LISPRO 1 UNITS: 100 INJECTION, SOLUTION INTRAVENOUS; SUBCUTANEOUS at 17:27

## 2019-07-17 RX ADMIN — INSULIN LISPRO 1 UNITS: 100 INJECTION, SOLUTION INTRAVENOUS; SUBCUTANEOUS at 21:06

## 2019-07-17 RX ADMIN — FAMOTIDINE 20 MG: 20 TABLET ORAL at 08:55

## 2019-07-17 ASSESSMENT — PAIN DESCRIPTION - ONSET: ONSET: UNABLE TO TELL

## 2019-07-17 ASSESSMENT — PAIN DESCRIPTION - ORIENTATION
ORIENTATION: RIGHT;MID
ORIENTATION: RIGHT;MID

## 2019-07-17 ASSESSMENT — PAIN DESCRIPTION - DESCRIPTORS
DESCRIPTORS: SHARP
DESCRIPTORS: SHARP

## 2019-07-17 ASSESSMENT — PAIN SCALES - GENERAL
PAINLEVEL_OUTOF10: 0
PAINLEVEL_OUTOF10: 0
PAINLEVEL_OUTOF10: 5

## 2019-07-17 ASSESSMENT — PAIN DESCRIPTION - LOCATION
LOCATION: BACK
LOCATION: BACK

## 2019-07-17 ASSESSMENT — PAIN DESCRIPTION - PAIN TYPE
TYPE: ACUTE PAIN
TYPE: ACUTE PAIN

## 2019-07-17 ASSESSMENT — PAIN DESCRIPTION - PROGRESSION: CLINICAL_PROGRESSION: GRADUALLY IMPROVING

## 2019-07-17 ASSESSMENT — PAIN - FUNCTIONAL ASSESSMENT: PAIN_FUNCTIONAL_ASSESSMENT: ACTIVITIES ARE NOT PREVENTED

## 2019-07-17 ASSESSMENT — PAIN DESCRIPTION - FREQUENCY
FREQUENCY: INTERMITTENT
FREQUENCY: INTERMITTENT

## 2019-07-17 NOTE — PROGRESS NOTES
CMP:    Recent Labs     07/15/19  0532 07/16/19  0526 07/17/19  0522   * 134* 133*   K 4.6 4.8 4.2    99 100   CO2 17* 18* 20*   BUN 50* 58* 54*   CREATININE 1.4* 1.2 1.2   GLUCOSE 171* 169* 147*   CALCIUM 8.5 8.7 8.4*   LABGLOM 48* 58* 58*     Troponin: No results for input(s): TROPONINI in the last 72 hours. BNP: No results for input(s): BNP in the last 72 hours. INR:   Recent Labs     07/15/19  0855 07/16/19  0526   INR 1.53* 1.55*     Lipids: No results for input(s): CHOL, LDLDIRECT, TRIG, HDL, AMYLASE, LIPASE in the last 72 hours. Liver:   Recent Labs     07/17/19 0522   *   *   ALKPHOS 105   PROT 6.6   LABALBU 2.4*   BILITOT 1.3*     Iron:  No results for input(s): IRONS, FERRITIN in the last 72 hours. Invalid input(s): LABIRONS    Objective:   Vitals: BP (!) 111/58   Pulse 94   Temp (!) 45.9 °F (7.7 °C) (Oral)   Resp 18   Ht 5' 8\" (1.727 m)   Wt 197 lb 1.6 oz (89.4 kg) Comment: life vest on  SpO2 96%   BMI 29.97 kg/m²    Wt Readings from Last 3 Encounters:   07/15/19 197 lb 1.6 oz (89.4 kg)   05/15/19 192 lb 9.6 oz (87.4 kg)   05/15/19 192 lb (87.1 kg)      24HR INTAKE/OUTPUT:      Intake/Output Summary (Last 24 hours) at 7/17/2019 1727  Last data filed at 7/17/2019 1300  Gross per 24 hour   Intake 420 ml   Output 800 ml   Net -380 ml       Constitutional:  Alert, awake, no apparent distress   Skin:normal   HEENT:Pupils are reactive . Throat is clear   Neck:supple with no thyromegally  Cardiovascular:  S1, S2 without  murmur  Respiratory: Clear to auscultation  Abdomen: +bs, soft, nontender  Ext: 3+ LE edema  Musculoskeletal:Intact  Neuro:Alert, awake and oriented with normal speech      Electronically signed by Mya Moctezuma MD on 7/17/2019 at 5:27 PM

## 2019-07-17 NOTE — PROGRESS NOTES
(+) bacilli (asymptomatic however so will not treat)    Urinalysis:       Lab Results   Component Value Date    NITRU NEGATIVE 07/10/2019    WBCUA 15-25 07/10/2019    BACTERIA NONE 07/10/2019    RBCUA 0-2 07/10/2019    BLOODU LARGE 07/10/2019    GLUCOSEU NEGATIVE 07/10/2019       DVT prophylaxis: [] Lovenox                                 [] SCDs                                 [x] SQ Heparin                                 [] Encourage ambulation           [] Already on Anticoagulation     Code Status: Full Code    PT/OT Eval Status: on case    Tele:   [x] yes SR              [] no    Active Hospital Problems    Diagnosis Date Noted    Critical aortic valve stenosis [I35.0]      Priority: High    Acute renal failure (HCC) [N17.9]      Priority: High    Nonrheumatic aortic valve stenosis [I35.0]      Priority: High    CKD (chronic kidney disease) stage 3, GFR 30-59 ml/min ([facility]) [Y91.0]     Metabolic acidosis [Z58.1]     Acute respiratory failure with hypoxia (HCC) [J96.01]     Bilateral pleural effusion [J90]     CHERI (acute [facility] injury) ([facility] [N17.9]     Acidosis [E87.2]     Acute on chronic systolic heart failure (HCC) [I50.23]     Pneumonia due to organism [J18.9] 07/10/2019       Electronically signed by Buck Poon MD on 7/17/2019 at 6:54 PM

## 2019-07-17 NOTE — PROGRESS NOTES
mg Oral BID    furosemide  20 mg Oral Daily    clopidogrel  75 mg Oral Daily    diltiazem  240 mg Oral Daily    simvastatin  20 mg Oral Nightly    insulin lispro  0-6 Units Subcutaneous TID     insulin lispro  0-3 Units Subcutaneous Nightly    sodium chloride flush  10 mL Intravenous 2 times per day    heparin (porcine)  5,000 Units Subcutaneous 3 times per day    ipratropium-albuterol  1 ampule Inhalation Q4H WA     melatonin, acetaminophen, glucose, dextrose, glucagon (rDNA), dextrose, sodium chloride flush, magnesium hydroxide, ondansetron  IV Drips/Infusions   dextrose       Vitals    Vitals    height is 5' 8\" (1.727 m) and weight is 197 lb 1.6 oz (89.4 kg). His oral temperature is 98.1 °F (36.7 °C). His blood pressure is 104/57 (abnormal) and his pulse is 92. His respiration is 24 and oxygen saturation is 95%. O2 Flow Rate (L/min): 1 L/min  I/O    Intake/Output Summary (Last 24 hours) at 7/17/2019 0822  Last data filed at 7/17/2019 0415  Gross per 24 hour   Intake 310 ml   Output 850 ml   Net -540 ml     Patient Vitals for the past 96 hrs (Last 3 readings):   Weight   07/15/19 0500 197 lb 1.6 oz (89.4 kg)   07/14/19 0651 190 lb 11.2 oz (86.5 kg)     Exam   Physical Exam   Constitutional: No distress on O2 per NC. Patient appears moderately built and  moderately nourished. Head: Normocephalic and atraumatic. Mouth/Throat: Oropharynx is clear and moist.  No oral thrush. Eyes: Conjunctivae are normal. Pupils are equal, round. No scleral icterus. Neck: Neck supple. No tracheal deviation present. Cardiovascular: S1 and S2 with no murmur. No peripheral edema  Pulmonary/Chest: Normal effort with bilateral air entry, bibasilar rales. No stridor. No respiratory distress. Patient exhibits no tenderness. Life vest in place. Abdominal: Soft. Bowel sounds audible. No distension or tenderness to palp.    Musculoskeletal: Moves all extremities  Neurological: Patient arouses to voice oriented x3

## 2019-07-17 NOTE — PLAN OF CARE
Problem: Risk for Impaired Skin Integrity  Goal: Tissue integrity - skin and mucous membranes  Description  Structural intactness and normal physiological function of skin and  mucous membranes. Outcome: Ongoing  Note:   Patient turned and repositioned every two hours. Patient able to assisting with repositioning however does fatigue quickly. No new skin breakdown. EPC ointment applied to buttocks. Allevyn applied to coccyx. Problem: Discharge Planning:  Goal: Discharged to appropriate level of care  Description  Discharged to appropriate level of care  Outcome: Ongoing  Note:   Planning discharge home with significant other when medically ready. Problem: Airway Clearance - Ineffective:  Goal: Clear lung sounds  Description  Clear lung sounds  Outcome: Ongoing  Note:   Possible need for home O2 eval when ready for discharge. Patient continues with O2 at 1L/NC. Denies any shortness of breath while at rest.     Problem: HEMODYNAMIC STATUS  Goal: Patient has stable vital signs and fluid balance  Outcome: Ongoing  Note:   BP within stable range. Cardizem held this morning due to parameters. Lasix 20mg daily. Lower leg edema noted however no additional edema since yesterday. Problem: Nutrition  Goal: Optimal nutrition therapy  Outcome: Ongoing  Note:   Poor appetite noted. Denies any nausea. Patient is tolerating supplements with meals.

## 2019-07-18 LAB
ANION GAP SERPL CALCULATED.3IONS-SCNC: 18 MEQ/L (ref 8–16)
BUN BLDV-MCNC: 46 MG/DL (ref 7–22)
CALCIUM SERPL-MCNC: 8.7 MG/DL (ref 8.5–10.5)
CHLORIDE BLD-SCNC: 101 MEQ/L (ref 98–111)
CO2: 22 MEQ/L (ref 23–33)
CREAT SERPL-MCNC: 1.2 MG/DL (ref 0.4–1.2)
GFR SERPL CREATININE-BSD FRML MDRD: 58 ML/MIN/1.73M2
GLUCOSE BLD-MCNC: 138 MG/DL (ref 70–108)
GLUCOSE BLD-MCNC: 141 MG/DL (ref 70–108)
GLUCOSE BLD-MCNC: 208 MG/DL (ref 70–108)
GLUCOSE BLD-MCNC: 227 MG/DL (ref 70–108)
GLUCOSE BLD-MCNC: 228 MG/DL (ref 70–108)
MAGNESIUM: 2.1 MG/DL (ref 1.6–2.4)
POTASSIUM SERPL-SCNC: 4 MEQ/L (ref 3.5–5.2)
SODIUM BLD-SCNC: 141 MEQ/L (ref 135–145)

## 2019-07-18 PROCEDURE — 6370000000 HC RX 637 (ALT 250 FOR IP): Performed by: HOSPITALIST

## 2019-07-18 PROCEDURE — 97530 THERAPEUTIC ACTIVITIES: CPT

## 2019-07-18 PROCEDURE — 83735 ASSAY OF MAGNESIUM: CPT

## 2019-07-18 PROCEDURE — 94640 AIRWAY INHALATION TREATMENT: CPT

## 2019-07-18 PROCEDURE — 2060000000 HC ICU INTERMEDIATE R&B

## 2019-07-18 PROCEDURE — 6370000000 HC RX 637 (ALT 250 FOR IP): Performed by: INTERNAL MEDICINE

## 2019-07-18 PROCEDURE — APPSS30 APP SPLIT SHARED TIME 16-30 MINUTES: Performed by: NURSE PRACTITIONER

## 2019-07-18 PROCEDURE — 2709999900 HC NON-CHARGEABLE SUPPLY

## 2019-07-18 PROCEDURE — 2580000003 HC RX 258: Performed by: NURSE PRACTITIONER

## 2019-07-18 PROCEDURE — 99232 SBSQ HOSP IP/OBS MODERATE 35: CPT | Performed by: INTERNAL MEDICINE

## 2019-07-18 PROCEDURE — 36415 COLL VENOUS BLD VENIPUNCTURE: CPT

## 2019-07-18 PROCEDURE — 99232 SBSQ HOSP IP/OBS MODERATE 35: CPT | Performed by: NURSE PRACTITIONER

## 2019-07-18 PROCEDURE — 6360000002 HC RX W HCPCS: Performed by: NURSE PRACTITIONER

## 2019-07-18 PROCEDURE — 6370000000 HC RX 637 (ALT 250 FOR IP): Performed by: NURSE PRACTITIONER

## 2019-07-18 PROCEDURE — 82948 REAGENT STRIP/BLOOD GLUCOSE: CPT

## 2019-07-18 PROCEDURE — 80048 BASIC METABOLIC PNL TOTAL CA: CPT

## 2019-07-18 PROCEDURE — 99233 SBSQ HOSP IP/OBS HIGH 50: CPT | Performed by: INTERNAL MEDICINE

## 2019-07-18 PROCEDURE — 97162 PT EVAL MOD COMPLEX 30 MIN: CPT

## 2019-07-18 PROCEDURE — 97110 THERAPEUTIC EXERCISES: CPT

## 2019-07-18 PROCEDURE — 2700000000 HC OXYGEN THERAPY PER DAY

## 2019-07-18 RX ORDER — ZOLPIDEM TARTRATE 5 MG/1
5 TABLET ORAL NIGHTLY PRN
Status: DISCONTINUED | OUTPATIENT
Start: 2019-07-19 | End: 2019-07-18

## 2019-07-18 RX ORDER — FUROSEMIDE 20 MG/1
20 TABLET ORAL ONCE
Status: COMPLETED | OUTPATIENT
Start: 2019-07-18 | End: 2019-07-18

## 2019-07-18 RX ORDER — HYDROXYZINE HYDROCHLORIDE 25 MG/1
25 TABLET, FILM COATED ORAL NIGHTLY PRN
Status: DISCONTINUED | OUTPATIENT
Start: 2019-07-18 | End: 2019-07-19 | Stop reason: HOSPADM

## 2019-07-18 RX ORDER — ZOLPIDEM TARTRATE 5 MG/1
5 TABLET ORAL NIGHTLY PRN
Status: DISCONTINUED | OUTPATIENT
Start: 2019-07-18 | End: 2019-07-19 | Stop reason: HOSPADM

## 2019-07-18 RX ADMIN — IPRATROPIUM BROMIDE AND ALBUTEROL SULFATE 1 AMPULE: .5; 3 SOLUTION RESPIRATORY (INHALATION) at 21:07

## 2019-07-18 RX ADMIN — FUROSEMIDE 20 MG: 20 TABLET ORAL at 14:49

## 2019-07-18 RX ADMIN — HYDROXYZINE HYDROCHLORIDE 25 MG: 25 TABLET, FILM COATED ORAL at 21:07

## 2019-07-18 RX ADMIN — Medication 10 ML: at 08:50

## 2019-07-18 RX ADMIN — ZOLPIDEM TARTRATE 5 MG: 5 TABLET ORAL at 23:39

## 2019-07-18 RX ADMIN — Medication 10 ML: at 20:23

## 2019-07-18 RX ADMIN — FAMOTIDINE 20 MG: 20 TABLET ORAL at 08:48

## 2019-07-18 RX ADMIN — HEPARIN SODIUM 5000 UNITS: 5000 INJECTION INTRAVENOUS; SUBCUTANEOUS at 20:14

## 2019-07-18 RX ADMIN — HEPARIN SODIUM 5000 UNITS: 5000 INJECTION INTRAVENOUS; SUBCUTANEOUS at 05:40

## 2019-07-18 RX ADMIN — SODIUM BICARBONATE 650 MG: 650 TABLET ORAL at 08:48

## 2019-07-18 RX ADMIN — CLOPIDOGREL 75 MG: 75 TABLET, FILM COATED ORAL at 08:48

## 2019-07-18 RX ADMIN — FUROSEMIDE 20 MG: 20 TABLET ORAL at 08:48

## 2019-07-18 ASSESSMENT — PAIN DESCRIPTION - DESCRIPTORS: DESCRIPTORS: SHARP

## 2019-07-18 ASSESSMENT — PAIN DESCRIPTION - FREQUENCY: FREQUENCY: INTERMITTENT

## 2019-07-18 ASSESSMENT — PAIN DESCRIPTION - ORIENTATION: ORIENTATION: RIGHT;MID

## 2019-07-18 ASSESSMENT — PAIN SCALES - GENERAL: PAINLEVEL_OUTOF10: 0

## 2019-07-18 ASSESSMENT — PAIN DESCRIPTION - PAIN TYPE: TYPE: ACUTE PAIN

## 2019-07-18 ASSESSMENT — PAIN DESCRIPTION - LOCATION: LOCATION: BACK

## 2019-07-18 NOTE — CARE COORDINATION
DISCHARGE BARRIERS  7/18/19, 10:11 AM    Reason for Referral:  \"will need new ECF\"  Mental Status:  Alert and oriented   Decision Making:  Makes his own decisions  Family/Social/Home Environment:  SW spoke to patient and his friend, Rowan Jackson, also in the room. They live together in a 2 story home. His bedroom was on the second floor with a walk-in shower with built in seat. They recently moved his bed downstairs for his convenience. This level has a bathtub shower. He hasn't really used it yet. .  She does not work. If she leaves to do errands he was able to stay alone. He has 3 children with 2 living in [place] and one in [place] (who will be coming home soon). He was not on home o2 PTA and he recently started to use a wheeled walker with seat. He never had HH  Current Services:  none  Current Equipment: see above  Payment Source: Medicare and [employer]  Concerns or Barriers to Discharge: We discussed his plan, he states he came to the hospital for one thing and found all of these other things wrong. He appears to be very depressed, states he is ready for death, does not want to die in [place] (he moved to [place] from [place] 15 years ago). We discussed the need to get stronger for his wife to be able to take care of him. He was agreeable to release his medical record to [facility], their choice of ECF. This was done, SW spoke to [place] with admissions, they have a bed and will review. Collabrative List of ECF/HH were provided: N/A, preference is [facility] [place] [facility]    Teach Back Method used with patient and SO regarding care plan and needs  Patient and SO verbalize understanding of the plan of care and contribute to goal setting.        Anticipated Needs/Discharge Plan:  Plan discharge to [facility], await acceptance    Electronically signed by BAKARI Chang on 7/18/2019 at 10:11 AM

## 2019-07-18 NOTE — PROGRESS NOTES
simple commands  Skin: Warm and dry. Labs   ABG  Lab Results   Component Value Date    PH 7.40 07/13/2019    PO2 77 07/13/2019    PCO2 29 07/13/2019    HCO3 18 07/13/2019    O2SAT 95 07/13/2019     Lab Results   Component Value Date    IFIO2 21 07/13/2019     CBC  Recent Labs     07/16/19  0526 07/17/19  0522   WBC 19.6* 15.2*   RBC 3.24* 3.35*   HGB 10.7* 10.9*   HCT 32.6* 34.6*   .6* 103.3*   MCH 33.0 32.5   MCHC 32.8 31.5*    233   MPV 11.5 11.5      BMP  Recent Labs     07/16/19  0526 07/17/19  0522 07/18/19  0531   * 133* 141   K 4.8 4.2 4.0   CL 99 100 101   CO2 18* 20* 22*   BUN 58* 54* 46*   CREATININE 1.2 1.2 1.2   GLUCOSE 169* 147* 138*   MG  --   --  2.1   CALCIUM 8.7 8.4* 8.7     LFT  Recent Labs     07/17/19  0522   *   *   BILITOT 1.3*   ALKPHOS 105     TROP  Lab Results   Component Value Date    TROPONINT 0.062 07/10/2019    TROPONINT 0.061 07/10/2019    TROPONINT 0.069 07/10/2019     BNP  Lab Results   Component Value Date    PROBNP 13554.0 07/10/2019     D-Dimer  No results found for: DDIMER  Lactic Acid  No results for input(s): LACTA in the last 72 hours. INR  Recent Labs     07/16/19  0526   INR 1.55*     PTT  No results for input(s): APTT in the last 72 hours. Glucose  Recent Labs     07/17/19  1639 07/17/19  2056 07/18/19  0629   POCGLU 159* 145* 141*     UA   No results for input(s): SPECGRAV, PHUR, COLORU, CLARITYU, MUCUS, PROTEINU, BLOODU, RBCUA, WBCUA, BACTERIA, NITRU, GLUCOSEU, BILIRUBINUR, UROBILINOGEN, KETUA, LABCAST, LABCASTTY, AMORPHOS in the last 72 hours. Invalid input(s): CRYSTALS  R thoracentesis   Removed 1 L fluid  Performed by IR  Results for Javid Norris (MRN [id number]) as of 7/17/2019 08:23   Ref.  Range 7/14/2019 12:15 7/16/2019 12:15   Character, Body Fluid Unknown HAZY    Glucose, Fluid Latest Units: mg/dl  159   LD, Fluid Latest Units: U/L  147   pH, Fluid Unknown  7.52   Protein, Fluid Latest Units: gm/dl  1.5   Body Fluid RBC Latest Units: /cumm 8000    Mesothelial Cells Body Fluid Unknown 1+    Mononuclear Cells Body Fluid Latest Units: % 12.5    Polymorphonuclear Cells Body Fluid Latest Units: % 87.5    Total Nucleated cells Body Fluid Latest Ref Range: 0 - 500 /cumm 1961 (H)    Total Volume Received Body Fluid Latest Units: ml 83.0      Serum Total Protein: 6.6  Serum LDH: 256    Total protein ratio i.e Pleural fluid total protein/ Serum Total protein: 1.5/6.6=0.23  LDH ratio i.e Pleural fluid LDH/ Serum LDH: 147/256 =0.57    Pleural fluid culture-No bacteria seen  Cytology-Pending      PFTs   No records   Echo     Summary   Ejection fraction was estimated at 25-30%.   There was severe global hypokinesis.   Doppler parameters were consistent with a reversible restrictive pattern,   indicative of decreased left ventricular diastolic compliance and/or   increased left atrial pressure (grade 3 diastolic dysfunction).   Left atrial size was moderately dilated.   Right atrial size was moderately dilated.   The aortic valve was calcified, thickened, and had reduced mobility.   DOPPLER: Transaortic velocity was 4.6 m/s, mean 58 mmHg, MICHELLE 0.22 cm2.   There is severe, critical aortic stenosis.   There was moderate mitral regurgitation.   There was mild tricuspid regurgitation.   Assuming RAP = 15 mmHg, the estimated RVSP = 43 mmHg.   Mild pulmonic regurgitation.   Ascending aorta = 3.0 cm.  IVC size is dilated with reduced respiratory phasic changes   (CVP~10-15 mmHg)   Left pleural effusion. Cultures    Procalcitonin  Lab Results   Component Value Date    PROCAL 0.09 07/10/2019       Blood Culture #1-No prelim growth  Blood Culture #2-No prelim growth  Respiratory Culture-pending   MRSA-Negative  VRE-Negative  Urine-Gram positive bacilli    Radiology    CXR  7/16/2019   1. The patient is status post ultrasound-guided right thoracentesis. There is no residual right pleural effusion. There is no pneumothorax.    2. There are perihilar and the

## 2019-07-18 NOTE — PROGRESS NOTES
[address] [address]  INPATIENT PHYSICAL THERAPY  EVALUATION  [facility] ICU STEPDOWN TELEMETRY 4K - [id number]    Time In: 5269  Time Out: 0956  Timed Code Treatment Minutes: 23 Minutes  Minutes: 33          Date: 2019  Patient Name: Nick Colorado,  Gender:  male        MRN: [id number]  : 1935  (82 y.o.)      Referring Practitioner: Georgi Mayen MD  Diagnosis: pneumonia  Additional Pertinent Hx: Per EMR: Edy Knott is a 82 y.o. male with past medical history of AS, CKD stage III, DM2, CHF EF 25-30%. Admitted 07/10/19 for worsening SOB. No cough or sputum production. CXR showed bilateral pleural effusion. Ct abdomen showed similar findings. He didn't tolerate diuresis due to Hypotension. He appears weak and deconditioned. He only smoked for few years. He worked with computers. No history of Asthma or COPD, or VINCENT. \"     Restrictions/Precautions:  Restrictions/Precautions: Fall Risk  Position Activity Restriction  Other position/activity restrictions: 1L    Subjective:  Chart Reviewed: Yes  Patient assessed for rehabilitation services?: Yes  Family / Caregiver Present: Yes  Subjective: RN approved PT session. Pt in supine and was agreeable to PT interventions. - see assessment for further details    General:  Follows Commands: Within Functional Limits         Hearing: Within functional limits         Pain:  Denies.           Social/Functional History:    Lives With: Spouse  Type of Home: House  Home Layout: Two level  Home Equipment: 4 wheeled walker     Bathroom Shower/Tub: Tub/Shower unit, Walk-in shower(WIS 2nd floor)             Ambulation Assistance: Independent(with 4TV)  Transfer Assistance: Independent          Additional Comments: I with showering, transfers and ambulation with use of 4WW    OBJECTIVE:  Range of Motion:  Right Lower Extremity: WFL  Left [facility]ity[facility]  Available AROM WFL - grossly deconditioned    Strength:  Right Lower Extremity: Impaired - quad: 4/5, hip flexion:

## 2019-07-19 VITALS
DIASTOLIC BLOOD PRESSURE: 75 MMHG | SYSTOLIC BLOOD PRESSURE: 117 MMHG | OXYGEN SATURATION: 98 % | TEMPERATURE: 97.4 F | HEART RATE: 99 BPM | HEIGHT: 68 IN | BODY MASS INDEX: 29.87 KG/M2 | WEIGHT: 197.1 LBS | RESPIRATION RATE: 18 BRPM

## 2019-07-19 PROCEDURE — 99231 SBSQ HOSP IP/OBS SF/LOW 25: CPT | Performed by: INTERNAL MEDICINE

## 2019-07-19 PROCEDURE — 99239 HOSP IP/OBS DSCHRG MGMT >30: CPT | Performed by: INTERNAL MEDICINE

## 2019-07-19 PROCEDURE — 6370000000 HC RX 637 (ALT 250 FOR IP): Performed by: HOSPITALIST

## 2019-07-19 PROCEDURE — 6360000002 HC RX W HCPCS: Performed by: NURSE PRACTITIONER

## 2019-07-19 RX ORDER — LANOLIN ALCOHOL/MO/W.PET/CERES
3 CREAM (GRAM) TOPICAL NIGHTLY PRN
Qty: 30 TABLET | Refills: 3 | Status: SHIPPED | OUTPATIENT
Start: 2019-07-19

## 2019-07-19 RX ORDER — HYDROXYZINE HYDROCHLORIDE 25 MG/1
25 TABLET, FILM COATED ORAL NIGHTLY PRN
Qty: 30 TABLET | Refills: 0 | Status: SHIPPED | OUTPATIENT
Start: 2019-07-19 | End: 2019-07-29

## 2019-07-19 RX ORDER — FUROSEMIDE 20 MG/1
20 TABLET ORAL DAILY
Qty: 60 TABLET | Refills: 3 | Status: SHIPPED | OUTPATIENT
Start: 2019-07-20

## 2019-07-19 RX ADMIN — FUROSEMIDE 20 MG: 20 TABLET ORAL at 10:32

## 2019-07-19 RX ADMIN — ONDANSETRON 4 MG: 2 INJECTION INTRAMUSCULAR; INTRAVENOUS at 10:29

## 2019-07-19 NOTE — PROGRESS NOTES
Hospcie visit made to see if questions in regards to service and to assist with discharging home with hospice today. Equipment going to be delivered this morning as well as portable oxygen to floor for transport home per private vehicle. Scripts signed, taken to [facility] pharmacy for fill to be delivered to take to home for admission to hospice. SO Vicki in room questions answered. Going to go home to have equipment delivered. Wish for Dr. Manasa Kamara to be attending for hospice. Call placed to physician office and agreeable to follow for hospice care. Son plans to pick patient up at 1-2 today to transport home. Ask that primary call [phone] when patient leaves and fax AVS to [phone].

## 2019-07-19 NOTE — PROGRESS NOTES
Renal Progress Note    Assessment and Plan:    1. Acute kidney injury mostly stable  2. Cardiomyopathy with low ejection fraction  3. Deconditioning  4. Right pleural effusion status post right thoracentesis  PLAN:  No labs today  Medications reviewed  No changes  Will sign off      Patient Active Problem List:     Pneumonia due to organism     Nonrheumatic aortic valve stenosis     Acute on chronic systolic heart failure (HCC)     Critical aortic valve stenosis     Acute renal failure ([facility])     CHERI (acute kidney injury) ([facility]     Acidosis     Bilateral pleural effusion     CKD (chronic kidney disease) stage 3, GFR 30-59 ml/min ([facility])     Metabolic acidosis     Acute respiratory failure with hypoxia ([facility])      Subjective:   Admit Date: 7/10/2019    Interval History:  Seen for acute kidney injury  Very sleepy  Of the care of by the staff  Patient going home today on hospice according to the staff  No labs done this morning  Blood pressure is good  Urine output is incompletely documented      Medications:   Scheduled Meds:   potassium bicarb-citric acid  20 mEq Oral Once    famotidine  20 mg Oral Daily    furosemide  20 mg Oral Daily    clopidogrel  75 mg Oral Daily    diltiazem  240 mg Oral Daily    sodium chloride flush  10 mL Intravenous 2 times per day    heparin (porcine)  5,000 Units Subcutaneous 3 times per day    ipratropium-albuterol  1 ampule Inhalation Q4H WA     Continuous Infusions:    CBC:   Recent Labs     07/17/19  0522   WBC 15.2*   HGB 10.9*        CMP:    Recent Labs     07/17/19  0522 07/18/19  0531   * 141   K 4.2 4.0    101   CO2 20* 22*   BUN 54* 46*   CREATININE 1.2 1.2   GLUCOSE 147* 138*   CALCIUM 8.4* 8.7   LABGLOM 58* 58*     Troponin: No results for input(s): TROPONINI in the last 72 hours. BNP: No results for input(s): BNP in the last 72 hours. INR: No results for input(s): INR in the last 72 hours.   Lipids: No results for input(s): CHOL, LDLDIRECT, TRIG,

## 2019-07-19 NOTE — FLOWSHEET NOTE
07/19/19 0000   Provider Notification   Reason for Communication Review case  (anxiety)   Provider Name Dr. Adriana Frances   Provider Notification Physician   Method of Communication Face to face   Response See orders   Notification Time 0006   Dr. Adriana Frances notified that Atarax did not work and that patient is extremely anxious. Ambien ordered.

## 2019-07-19 NOTE — FLOWSHEET NOTE
07/18/19 2041   Provider Notification   Reason for Communication Review case  (not sleeping )   Provider Name Dr. Mir Ann   Provider Notification Physician   Method of Communication Secure Message   Response See orders   Notification Time 2041   Dr Mir Ann notified that Patient is complaining that he can not sleep, He has melatonin ordered but states it has not helped him. Can we get something else to help him sleep tonight? Atarax ordered.

## 2019-07-20 ENCOUNTER — CARE COORDINATION (OUTPATIENT)
Dept: CASE MANAGEMENT | Age: 84
End: 2019-07-20

## 2019-07-21 ENCOUNTER — CARE COORDINATION (OUTPATIENT)
Dept: CASE MANAGEMENT | Age: 84
End: 2019-07-21

## 2019-07-21 LAB
ANAEROBIC CULTURE: NORMAL
BODY FLUID CULTURE, STERILE: NORMAL
GRAM STAIN RESULT: NORMAL

## 2019-08-04 NOTE — DISCHARGE SUMMARY
his LFT's are elevated; no nausea/vomiting so  HIDA scan has been ordered and showed patent cystic and common bile ducts without evidence of acute cholecystitis; pt seems tired today; states he did not sleep well last PM; disappointed that thora was not done today; no family at bedside     7/16: sitting on couch; appears short of breath; states is anxious about thoracentesis today; chronically ill appearing; drowsy however received Ativan 0.5 mg at 2111 and 0333~stopped and added Melatonin. He ended u getting thoracentesis which improved his breathing. Mr. Tori Dumont ultimately elected to no longer pursue aggressive therapies and declined TAVR. He went home with hospice. Discussion was had with Cardiology at bedside. Exam:     Vitals:  Vitals:    07/18/19 1517 07/18/19 2015 07/18/19 2330 07/19/19 1117   BP: (!) 100/58 123/87 131/72 117/75   Pulse: 97 98 115 99   Resp: 18 18 18 18   Temp: 98.1 °F (36.7 °C) 98.2 °F (36.8 °C) 98 °F (36.7 °C) 97.4 °F (36.3 °C)   TempSrc: Oral Oral Oral Oral   SpO2: 96% 94% 100% 98%   Weight:       Height:         Weight: Weight: 197 lb 1.6 oz (89.4 kg)(life vest on)     24 hour intake/output:No intake or output data in the 24 hours ending 08/04/19 1241      General appearance:  No apparent distress, appears stated age and cooperative. HEENT:  Normal cephalic, atraumatic without obvious deformity. Pupils equal, round, and reactive to light. Extra ocular muscles intact. Conjunctivae/corneas clear. Neck: Supple, with full range of motion. No jugular venous distention. Trachea midline. Respiratory:  Normal respiratory effort. Clear to auscultation, bilaterally without Rales/Wheezes/Rhonchi. Cardiovascular:  Regular rate and rhythm with normal S1/S2 without murmurs, rubs or gallops. Abdomen: Soft, non-tender, non-distended with normal bowel sounds. Musculoskeletal:  No clubbing, cyanosis or edema bilaterally. Full range of motion without deformity.   Skin: Skin color, texture, report has been created using voice recognition software. It may contain minor errors which are inherent in voice recognition technology. **      Final report electronically signed by Dr. Radha Morton on 7/15/2019 2:45 PM      NM HEPATOBILIARY   Final Result      Patent cystic and common bile ducts without evidence of acute cholecystitis. Final report electronically signed by Dr. Constantine Rogers on 7/12/2019 3:24 PM      US LIVER   Final Result       1. Cholelithiasis. Nondistention of the gallbladder limits detail of the gallbladder wall. 2. Partial visualization of right pleural effusion               **This report has been created using voice recognition software. It may contain minor errors which are inherent in voice recognition technology. **      Final report electronically signed by Dr. Inez Taylor on 7/11/2019 8:38 PM      [id number] [employer]   Final Result   Addendum 1 of 1   ** ADDENDUM #1 **      Addendum:      Review of images with additional clinical history. There is redemonstration of a contracted gallbladder limiting detail with    irregularity of the wall near the fundus this may reflect a solid mass the    detail of which is limited. As noted previously there are several    echogenic calculi again noted within    the lumen. There is no pericholecystic fluid. Heterogeneous appearing thickening of the distal gallbladder fundus. Underlying mass is not excluded. Redemonstration of multiple gallstones. A phone call and secured tacks were sent to Dr. Harrison Perry admitting    physician on July 13, 2019 4:32 PM      Final report electronically signed by Dr. Inez Taylor on 7/13/2019    4:32 PM      ** ORIGINAL REPORT **   PROCEDURE: US GALLBLADDER RUQ      CLINICAL INFORMATION: Liver and gallbladder ultrasound.       COMPARISON: CT abdomen and pelvis July 10, 2019      TECHNIQUE: Multiplanar sonographic images were obtained of the structures    in the right upper WORK  IP CONSULT TO HOSPICE    Disposition: Home  Condition at Discharge: Terminal    Code Status:  Prior     Patient Instructions:    Discharge lab work: none  Activity: activity as tolerated  Diet: No diet orders on file      Follow-up visits:   Mayte Coulter MD  [address]               Discharge Medications:      Bruce Alvarez   Home Medication Instructions SCY:[id number]    Printed on:08/04/19 1241   Medication Information                      clopidogrel (PLAVIX) 75 MG tablet  Take 75 mg by mouth daily             diltiazem (CARDIZEM CD) 240 MG extended release capsule  Take 240 mg by mouth daily             furosemide (LASIX) 20 MG tablet  Take 1 tablet by mouth daily             melatonin 3 MG TABS tablet  Take 1 tablet by mouth nightly as needed (insomnia)             pantoprazole sodium (PROTONIX) 40 MG PACK packet  Take 40 mg by mouth every morning (before breakfast)                 Time Spent on discharge is more than 30 minutes in the examination, evaluation, counseling and review of medications and discharge plan. Signed: Thank you Mayte Coulter MD for the opportunity to be involved in this patient's care.     Electronically signed by Kimi Rosenthal MD on 8/4/2019 at 12:41 PM

## 2019-08-05 ENCOUNTER — CARE COORDINATION (OUTPATIENT)
Dept: CASE MANAGEMENT | Age: 84
End: 2019-08-05

## 2019-08-05 NOTE — CARE COORDINATION
[facility] Transitions Follow Up Call    2019    Patient: Gia Barrno  Patient : 1935   MRN: [id number]  Reason for Admission:   Discharge Date: 19 RARS: Readmission Risk Score: 16         Attempted to contact patient for initial BPCI-A transition call. Unable to reach, no answer, VM not set up, rec'd message \"party temporarily unavailable. Care Transitions Subsequent and Final Call    Subsequent and Final Calls  Care Transitions Interventions  Other Interventions: Follow Up  No future appointments.     Sadi Andujar RN

## 2019-08-19 LAB
FUNGUS IDENTIFIED: NORMAL
FUNGUS SMEAR: NORMAL

## 2019-09-02 LAB — AFB CULTURE & SMEAR: NORMAL

## 2019-09-03 ENCOUNTER — CARE COORDINATION (OUTPATIENT)
Dept: CASE MANAGEMENT | Age: 84
End: 2019-09-03

## 2019-10-02 ENCOUNTER — CARE COORDINATION (OUTPATIENT)
Dept: CASE MANAGEMENT | Age: 84
End: 2019-10-02

## 2019-10-09 ENCOUNTER — CARE COORDINATION (OUTPATIENT)
Dept: CASE MANAGEMENT | Age: 84
End: 2019-10-09

## 2019-10-15 ENCOUNTER — CARE COORDINATION (OUTPATIENT)
Dept: CASE MANAGEMENT | Age: 84
End: 2019-10-15

## 2020-03-25 PROBLEM — N17.9 ACUTE RENAL FAILURE (HCC): Status: RESOLVED | Noted: 2020-03-25 | Resolved: 2020-03-24
